# Patient Record
Sex: FEMALE | Race: WHITE | NOT HISPANIC OR LATINO | Employment: OTHER | URBAN - METROPOLITAN AREA
[De-identification: names, ages, dates, MRNs, and addresses within clinical notes are randomized per-mention and may not be internally consistent; named-entity substitution may affect disease eponyms.]

---

## 2023-10-10 ENCOUNTER — PREP FOR PROCEDURE (OUTPATIENT)
Dept: NEUROSURGERY | Facility: HOSPITAL | Age: 63
End: 2023-10-10
Payer: COMMERCIAL

## 2023-10-10 DIAGNOSIS — D35.2 PITUITARY ADENOMA (MULTI): Primary | ICD-10-CM

## 2023-10-13 PROBLEM — D35.2 PITUITARY ADENOMA (MULTI): Status: ACTIVE | Noted: 2023-10-10

## 2023-10-22 PROBLEM — E87.6 HYPOKALEMIA: Status: ACTIVE | Noted: 2018-09-03

## 2023-10-22 PROBLEM — R03.0 ELEVATED BLOOD PRESSURE READING: Status: ACTIVE | Noted: 2018-09-11

## 2023-10-22 PROBLEM — G43.009 MIGRAINE WITHOUT AURA AND RESPONSIVE TO TREATMENT: Status: ACTIVE | Noted: 2018-02-26

## 2023-10-22 PROBLEM — E55.9 VITAMIN D DEFICIENCY: Status: ACTIVE | Noted: 2018-02-26

## 2023-10-22 PROBLEM — R43.8 DECREASED SENSE OF SMELL: Status: ACTIVE | Noted: 2023-10-22

## 2023-10-22 PROBLEM — J34.89 RHINORRHEA: Status: ACTIVE | Noted: 2023-10-22

## 2023-10-22 PROBLEM — M81.0 AGE-RELATED OSTEOPOROSIS WITHOUT CURRENT PATHOLOGICAL FRACTURE: Status: ACTIVE | Noted: 2018-02-26

## 2023-10-22 PROBLEM — R51.9 HEADACHE: Status: ACTIVE | Noted: 2018-09-03

## 2023-10-22 PROBLEM — R53.83 FATIGUE: Status: ACTIVE | Noted: 2018-09-03

## 2023-10-22 PROBLEM — H35.041: Status: ACTIVE | Noted: 2018-10-16

## 2023-10-22 PROBLEM — E78.5 HYPERLIPIDEMIA: Status: ACTIVE | Noted: 2018-02-26

## 2023-10-22 PROBLEM — R11.2 NAUSEA AND VOMITING: Status: ACTIVE | Noted: 2018-09-03

## 2023-10-22 PROBLEM — J34.2 DEVIATED SEPTUM: Status: ACTIVE | Noted: 2023-10-22

## 2023-10-22 RX ORDER — IBUPROFEN 600 MG/1
TABLET ORAL
Status: ON HOLD | COMMUNITY
Start: 2023-04-19 | End: 2023-11-17 | Stop reason: WASHOUT

## 2023-10-22 RX ORDER — PANTOPRAZOLE SODIUM 40 MG/1
40 TABLET, DELAYED RELEASE ORAL
COMMUNITY
Start: 2023-07-12

## 2023-10-22 RX ORDER — ETODOLAC 500 MG/1
1 TABLET, FILM COATED ORAL DAILY PRN
COMMUNITY
Start: 2018-09-04 | End: 2023-11-06

## 2023-10-22 RX ORDER — BUDESONIDE AND FORMOTEROL FUMARATE DIHYDRATE 160; 4.5 UG/1; UG/1
2 AEROSOL RESPIRATORY (INHALATION) 2 TIMES DAILY
COMMUNITY
Start: 2018-09-11 | End: 2023-11-06

## 2023-10-22 RX ORDER — METHOCARBAMOL 500 MG/1
TABLET, FILM COATED ORAL
COMMUNITY
Start: 2023-04-19 | End: 2023-11-06 | Stop reason: ALTCHOICE

## 2023-10-22 RX ORDER — VALACYCLOVIR HYDROCHLORIDE 500 MG/1
500 TABLET, FILM COATED ORAL 2 TIMES DAILY
COMMUNITY
Start: 2023-01-31

## 2023-10-22 RX ORDER — ATORVASTATIN CALCIUM 20 MG/1
20 TABLET, FILM COATED ORAL DAILY
COMMUNITY
Start: 2023-07-12

## 2023-10-22 RX ORDER — IBANDRONATE SODIUM 150 MG/1
150 TABLET, FILM COATED ORAL
COMMUNITY
End: 2023-11-06 | Stop reason: ALTCHOICE

## 2023-10-22 RX ORDER — LANOLIN ALCOHOL/MO/W.PET/CERES
1 CREAM (GRAM) TOPICAL DAILY
COMMUNITY
Start: 2018-09-04 | End: 2023-11-06 | Stop reason: ALTCHOICE

## 2023-10-22 RX ORDER — ERYTHROMYCIN 5 MG/G
OINTMENT OPHTHALMIC
COMMUNITY
Start: 2023-02-15 | End: 2023-11-06

## 2023-10-22 RX ORDER — ELETRIPTAN HYDROBROMIDE 40 MG/1
40 TABLET, FILM COATED ORAL ONCE AS NEEDED
COMMUNITY
End: 2023-11-06

## 2023-10-22 RX ORDER — CITALOPRAM 20 MG/1
20 TABLET, FILM COATED ORAL DAILY
COMMUNITY
Start: 2023-07-12

## 2023-10-22 RX ORDER — ATORVASTATIN CALCIUM 10 MG/1
1 TABLET, FILM COATED ORAL NIGHTLY
COMMUNITY
Start: 2019-06-14 | End: 2023-11-06

## 2023-10-24 ENCOUNTER — LAB (OUTPATIENT)
Dept: LAB | Facility: LAB | Age: 63
End: 2023-10-24
Payer: MEDICARE

## 2023-10-24 ENCOUNTER — OFFICE VISIT (OUTPATIENT)
Dept: ENDOCRINOLOGY | Facility: CLINIC | Age: 63
End: 2023-10-24
Payer: MEDICARE

## 2023-10-24 VITALS
SYSTOLIC BLOOD PRESSURE: 128 MMHG | WEIGHT: 182 LBS | HEIGHT: 65 IN | DIASTOLIC BLOOD PRESSURE: 78 MMHG | BODY MASS INDEX: 30.32 KG/M2

## 2023-10-24 DIAGNOSIS — R53.83 OTHER FATIGUE: ICD-10-CM

## 2023-10-24 DIAGNOSIS — E78.5 HYPERLIPIDEMIA, UNSPECIFIED HYPERLIPIDEMIA TYPE: ICD-10-CM

## 2023-10-24 DIAGNOSIS — M81.0 OSTEOPOROSIS, UNSPECIFIED OSTEOPOROSIS TYPE, UNSPECIFIED PATHOLOGICAL FRACTURE PRESENCE: ICD-10-CM

## 2023-10-24 DIAGNOSIS — D35.2 PITUITARY ADENOMA (MULTI): ICD-10-CM

## 2023-10-24 DIAGNOSIS — D35.2 PITUITARY ADENOMA (MULTI): Primary | ICD-10-CM

## 2023-10-24 LAB
CORTIS SERPL-MCNC: 13.8 UG/DL (ref 2.5–20)
FSH SERPL-ACNC: 42.7 IU/L
LH SERPL-ACNC: 16.6 IU/L
PROLACTIN SERPL-MCNC: 5.8 UG/L (ref 3–20)
T3FREE SERPL-MCNC: 3.1 PG/ML (ref 2.3–4.2)
T4 FREE SERPL-MCNC: 1.12 NG/DL (ref 0.78–1.48)
TSH SERPL-ACNC: 1.78 MIU/L (ref 0.44–3.98)

## 2023-10-24 PROCEDURE — 84146 ASSAY OF PROLACTIN: CPT

## 2023-10-24 PROCEDURE — 83002 ASSAY OF GONADOTROPIN (LH): CPT

## 2023-10-24 PROCEDURE — 99204 OFFICE O/P NEW MOD 45 MIN: CPT | Performed by: INTERNAL MEDICINE

## 2023-10-24 PROCEDURE — 84305 ASSAY OF SOMATOMEDIN: CPT

## 2023-10-24 PROCEDURE — 84439 ASSAY OF FREE THYROXINE: CPT

## 2023-10-24 PROCEDURE — 84443 ASSAY THYROID STIM HORMONE: CPT

## 2023-10-24 PROCEDURE — 82024 ASSAY OF ACTH: CPT

## 2023-10-24 PROCEDURE — 83001 ASSAY OF GONADOTROPIN (FSH): CPT

## 2023-10-24 PROCEDURE — 82533 TOTAL CORTISOL: CPT

## 2023-10-24 PROCEDURE — 36415 COLL VENOUS BLD VENIPUNCTURE: CPT

## 2023-10-24 PROCEDURE — 84481 FREE ASSAY (FT-3): CPT

## 2023-10-24 RX ORDER — DEXAMETHASONE 1 MG/1
TABLET ORAL
Qty: 1 TABLET | Refills: 0 | Status: SHIPPED | OUTPATIENT
Start: 2023-10-24 | End: 2023-11-19 | Stop reason: HOSPADM
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
## 2023-10-26 ENCOUNTER — LAB (OUTPATIENT)
Dept: LAB | Facility: LAB | Age: 63
End: 2023-10-26
Payer: MEDICARE

## 2023-10-26 DIAGNOSIS — D35.2 PITUITARY ADENOMA (MULTI): ICD-10-CM

## 2023-10-26 LAB
ACTH PLAS-MCNC: 47.2 PG/ML (ref 7.2–63.3)
CORTIS SERPL-MCNC: 1.7 UG/DL (ref 2.5–20)

## 2023-10-26 PROCEDURE — 36415 COLL VENOUS BLD VENIPUNCTURE: CPT

## 2023-10-26 PROCEDURE — 82533 TOTAL CORTISOL: CPT

## 2023-10-27 LAB
IGF-I SERPL-MCNC: 142 NG/ML (ref 34–241)
IGF-I Z-SCORE SERPL: 0.5

## 2023-10-31 ENCOUNTER — TELEMEDICINE CLINICAL SUPPORT (OUTPATIENT)
Dept: PREADMISSION TESTING | Facility: HOSPITAL | Age: 63
End: 2023-10-31
Payer: MEDICARE

## 2023-11-06 ENCOUNTER — HOSPITAL ENCOUNTER (OUTPATIENT)
Dept: CARDIOLOGY | Facility: HOSPITAL | Age: 63
Discharge: HOME | End: 2023-11-06
Payer: MEDICARE

## 2023-11-06 ENCOUNTER — PRE-ADMISSION TESTING (OUTPATIENT)
Dept: PREADMISSION TESTING | Facility: HOSPITAL | Age: 63
End: 2023-11-06
Payer: MEDICARE

## 2023-11-06 VITALS
WEIGHT: 183.1 LBS | BODY MASS INDEX: 31.26 KG/M2 | DIASTOLIC BLOOD PRESSURE: 87 MMHG | HEIGHT: 64 IN | TEMPERATURE: 98.7 F | SYSTOLIC BLOOD PRESSURE: 138 MMHG | HEART RATE: 70 BPM | OXYGEN SATURATION: 98 %

## 2023-11-06 DIAGNOSIS — Z01.818 PREOPERATIVE EXAMINATION: ICD-10-CM

## 2023-11-06 DIAGNOSIS — Z01.818 PREOPERATIVE EXAMINATION: Primary | ICD-10-CM

## 2023-11-06 DIAGNOSIS — E78.5 HYPERLIPIDEMIA, UNSPECIFIED HYPERLIPIDEMIA TYPE: ICD-10-CM

## 2023-11-06 DIAGNOSIS — D35.2 PITUITARY ADENOMA (MULTI): ICD-10-CM

## 2023-11-06 LAB
ABO GROUP (TYPE) IN BLOOD: NORMAL
ALBUMIN SERPL BCP-MCNC: 4.6 G/DL (ref 3.4–5)
ALP SERPL-CCNC: 144 U/L (ref 33–136)
ALT SERPL W P-5'-P-CCNC: 50 U/L (ref 7–45)
ANION GAP SERPL CALC-SCNC: 17 MMOL/L (ref 10–20)
ANTIBODY SCREEN: NORMAL
APPEARANCE UR: CLEAR
APTT PPP: 34 SECONDS (ref 27–38)
AST SERPL W P-5'-P-CCNC: 42 U/L (ref 9–39)
BILIRUB SERPL-MCNC: 0.9 MG/DL (ref 0–1.2)
BILIRUB UR STRIP.AUTO-MCNC: NEGATIVE MG/DL
BUN SERPL-MCNC: 16 MG/DL (ref 6–23)
CALCIUM SERPL-MCNC: 10.2 MG/DL (ref 8.6–10.6)
CHLORIDE SERPL-SCNC: 107 MMOL/L (ref 98–107)
CO2 SERPL-SCNC: 23 MMOL/L (ref 21–32)
COLOR UR: YELLOW
CREAT SERPL-MCNC: 0.76 MG/DL (ref 0.5–1.05)
ERYTHROCYTE [DISTWIDTH] IN BLOOD BY AUTOMATED COUNT: 14.1 % (ref 11.5–14.5)
GFR SERPL CREATININE-BSD FRML MDRD: 87 ML/MIN/1.73M*2
GLUCOSE SERPL-MCNC: 93 MG/DL (ref 74–99)
GLUCOSE UR STRIP.AUTO-MCNC: NEGATIVE MG/DL
HCT VFR BLD AUTO: 44.1 % (ref 36–46)
HGB BLD-MCNC: 14.4 G/DL (ref 12–16)
HOLD SPECIMEN: NORMAL
INR PPP: 1.1 (ref 0.9–1.1)
KETONES UR STRIP.AUTO-MCNC: NEGATIVE MG/DL
LEUKOCYTE ESTERASE UR QL STRIP.AUTO: ABNORMAL
MCH RBC QN AUTO: 29.6 PG (ref 26–34)
MCHC RBC AUTO-ENTMCNC: 32.7 G/DL (ref 32–36)
MCV RBC AUTO: 91 FL (ref 80–100)
MUCOUS THREADS #/AREA URNS AUTO: NORMAL /LPF
NITRITE UR QL STRIP.AUTO: NEGATIVE
NRBC BLD-RTO: 0 /100 WBCS (ref 0–0)
PH UR STRIP.AUTO: 5 [PH]
PLATELET # BLD AUTO: 256 X10*3/UL (ref 150–450)
POTASSIUM SERPL-SCNC: 4.5 MMOL/L (ref 3.5–5.3)
PROT SERPL-MCNC: 7.6 G/DL (ref 6.4–8.2)
PROT UR STRIP.AUTO-MCNC: NEGATIVE MG/DL
PROTHROMBIN TIME: 12 SECONDS (ref 9.8–12.8)
RBC # BLD AUTO: 4.87 X10*6/UL (ref 4–5.2)
RBC # UR STRIP.AUTO: NEGATIVE /UL
RBC #/AREA URNS AUTO: NORMAL /HPF
RH FACTOR (ANTIGEN D): NORMAL
SODIUM SERPL-SCNC: 142 MMOL/L (ref 136–145)
SP GR UR STRIP.AUTO: 1.02
SQUAMOUS #/AREA URNS AUTO: NORMAL /HPF
UROBILINOGEN UR STRIP.AUTO-MCNC: <2 MG/DL
WBC # BLD AUTO: 8.7 X10*3/UL (ref 4.4–11.3)
WBC #/AREA URNS AUTO: NORMAL /HPF

## 2023-11-06 PROCEDURE — 36415 COLL VENOUS BLD VENIPUNCTURE: CPT

## 2023-11-06 PROCEDURE — 93005 ELECTROCARDIOGRAM TRACING: CPT

## 2023-11-06 PROCEDURE — 93010 ELECTROCARDIOGRAM REPORT: CPT | Performed by: INTERNAL MEDICINE

## 2023-11-06 PROCEDURE — 81001 URINALYSIS AUTO W/SCOPE: CPT | Performed by: NURSE PRACTITIONER

## 2023-11-06 PROCEDURE — 85730 THROMBOPLASTIN TIME PARTIAL: CPT | Performed by: NURSE PRACTITIONER

## 2023-11-06 PROCEDURE — 87086 URINE CULTURE/COLONY COUNT: CPT | Performed by: NURSE PRACTITIONER

## 2023-11-06 PROCEDURE — 80053 COMPREHEN METABOLIC PANEL: CPT | Performed by: NURSE PRACTITIONER

## 2023-11-06 PROCEDURE — 86920 COMPATIBILITY TEST SPIN: CPT

## 2023-11-06 PROCEDURE — 85610 PROTHROMBIN TIME: CPT | Performed by: NURSE PRACTITIONER

## 2023-11-06 PROCEDURE — 85027 COMPLETE CBC AUTOMATED: CPT | Performed by: NURSE PRACTITIONER

## 2023-11-06 PROCEDURE — 87081 CULTURE SCREEN ONLY: CPT | Performed by: NURSE PRACTITIONER

## 2023-11-06 PROCEDURE — 99204 OFFICE O/P NEW MOD 45 MIN: CPT | Performed by: NURSE PRACTITIONER

## 2023-11-06 PROCEDURE — 86901 BLOOD TYPING SEROLOGIC RH(D): CPT | Performed by: NURSE PRACTITIONER

## 2023-11-06 RX ORDER — CHLORHEXIDINE GLUCONATE ORAL RINSE 1.2 MG/ML
SOLUTION DENTAL
Qty: 473 ML | Refills: 0 | Status: SHIPPED | OUTPATIENT
Start: 2023-11-06 | End: 2023-11-19 | Stop reason: HOSPADM

## 2023-11-06 RX ORDER — CHLORHEXIDINE GLUCONATE 40 MG/ML
SOLUTION TOPICAL 2 TIMES DAILY
Qty: 473 ML | Refills: 0 | Status: SHIPPED | OUTPATIENT
Start: 2023-11-06 | End: 2023-11-19 | Stop reason: HOSPADM

## 2023-11-06 ASSESSMENT — DUKE ACTIVITY SCORE INDEX (DASI)
TOTAL_SCORE: 58.2
CAN YOU PARTICIPATE IN STRENOUS SPORTS LIKE SWIMMING, SINGLES TENNIS, FOOTBALL, BASKETBALL, OR SKIING: YES
CAN YOU DO HEAVY WORK AROUND THE HOUSE LIKE SCRUBBING FLOORS OR LIFTING AND MOVING HEAVY FURNITURE: YES
CAN YOU TAKE CARE OF YOURSELF (EAT, DRESS, BATHE, OR USE TOILET): YES
CAN YOU PARTICIPATE IN MODERATE RECREATIONAL ACTIVITIES LIKE GOLF, BOWLING, DANCING, DOUBLES TENNIS OR THROWING A BASEBALL OR FOOTBALL: YES
CAN YOU CLIMB A FLIGHT OF STAIRS OR WALK UP A HILL: YES
CAN YOU DO YARD WORK LIKE RAKING LEAVES, WEEDING OR PUSHING A MOWER: YES
CAN YOU RUN A SHORT DISTANCE: YES
CAN YOU WALK INDOORS, SUCH AS AROUND YOUR HOUSE: YES
CAN YOU DO MODERATE WORK AROUND THE HOUSE LIKE VACUUMING, SWEEPING FLOORS OR CARRYING GROCERIES: YES
CAN YOU DO LIGHT WORK AROUND THE HOUSE LIKE DUSTING OR WASHING DISHES: YES
CAN YOU HAVE SEXUAL RELATIONS: YES
CAN YOU TAKE CARE OF YOURSELF (EAT, DRESS, BATHE, OR USE TOILET): YES
DASI METS SCORE: 9.9
CAN YOU WALK A BLOCK OR TWO ON LEVEL GROUND: YES

## 2023-11-06 ASSESSMENT — ENCOUNTER SYMPTOMS
MUSCULOSKELETAL NEGATIVE: 1
GASTROINTESTINAL NEGATIVE: 1
ENDOCRINE NEGATIVE: 1
RESPIRATORY NEGATIVE: 1
CARDIOVASCULAR NEGATIVE: 1
NECK NEGATIVE: 1
EYES NEGATIVE: 1
NEUROLOGICAL NEGATIVE: 1
CONSTITUTIONAL NEGATIVE: 1

## 2023-11-06 ASSESSMENT — LIFESTYLE VARIABLES: SMOKING_STATUS: NONSMOKER

## 2023-11-06 NOTE — PREPROCEDURE INSTRUCTIONS
NPO Instructions:    Do not eat any food after midnight the night before your surgery/procedure.  You may have 10 ounces of clear liquids until TWO hours before surgery/procedure. This includes water, black tea/coffee, (no milk or cream) apple juice and electrolyte drinks (Gatorade).  You may chew gum up to TWO hours before your surgery/procedure.    Additional Instructions:     The Day before Surgery:  Review your medication instructions, stop indicated medications  You will be contacted in the evening regarding the time of your arrival to facility and surgery time    Day of Surgery:  Review your medication instructions, take indicated medications  Wear  comfortable loose fitting clothing  Do not use moisturizers, creams, lotions or perfume  All jewelry and valuables should be left at home      Samantha Meeson, MSN, NP-C  Adult-Gerontology Nurse Practitioner II  Department of Anesthesiology and Perioperative Medicine  Main phone [phone]  Direct phone [phone]  Fax [phone]
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
## 2023-11-07 LAB
BACTERIA UR CULT: NORMAL
STAPHYLOCOCCUS SPEC CULT: ABNORMAL

## 2023-11-08 ENCOUNTER — PREP FOR PROCEDURE (OUTPATIENT)
Dept: NEUROSURGERY | Facility: HOSPITAL | Age: 63
End: 2023-11-08
Payer: COMMERCIAL

## 2023-11-08 DIAGNOSIS — D35.2 PITUITARY ADENOMA (MULTI): Primary | ICD-10-CM

## 2023-11-08 LAB
ATRIAL RATE: 68 BPM
P AXIS: -8 DEGREES
P OFFSET: 201 MS
P ONSET: 152 MS
PR INTERVAL: 128 MS
Q ONSET: 216 MS
QRS COUNT: 11 BEATS
QRS DURATION: 68 MS
QT INTERVAL: 412 MS
QTC CALCULATION(BAZETT): 438 MS
QTC FREDERICIA: 429 MS
R AXIS: -18 DEGREES
T AXIS: -2 DEGREES
T OFFSET: 422 MS
VENTRICULAR RATE: 68 BPM

## 2023-11-16 ENCOUNTER — ANESTHESIA EVENT (OUTPATIENT)
Dept: OPERATING ROOM | Facility: HOSPITAL | Age: 63
DRG: 830 | End: 2023-11-16
Payer: MEDICARE

## 2023-11-17 ENCOUNTER — ANESTHESIA (OUTPATIENT)
Dept: OPERATING ROOM | Facility: HOSPITAL | Age: 63
DRG: 830 | End: 2023-11-17
Payer: MEDICARE

## 2023-11-17 ENCOUNTER — HOSPITAL ENCOUNTER (INPATIENT)
Facility: HOSPITAL | Age: 63
LOS: 2 days | Discharge: HOME | DRG: 830 | End: 2023-11-19
Attending: NEUROLOGICAL SURGERY | Admitting: NEUROLOGICAL SURGERY
Payer: MEDICARE

## 2023-11-17 DIAGNOSIS — D36.9 MACROADENOMA: Primary | ICD-10-CM

## 2023-11-17 DIAGNOSIS — D35.2 PITUITARY ADENOMA (MULTI): ICD-10-CM

## 2023-11-17 PROBLEM — I10 HTN (HYPERTENSION): Status: ACTIVE | Noted: 2023-11-17

## 2023-11-17 LAB
ABO GROUP (TYPE) IN BLOOD: NORMAL
ALBUMIN SERPL BCP-MCNC: 3.7 G/DL (ref 3.4–5)
ALBUMIN SERPL BCP-MCNC: 4.2 G/DL (ref 3.4–5)
ALBUMIN SERPL BCP-MCNC: 4.2 G/DL (ref 3.4–5)
ALBUMIN SERPL BCP-MCNC: 4.4 G/DL (ref 3.4–5)
ANION GAP SERPL CALC-SCNC: 12 MMOL/L (ref 10–20)
ANION GAP SERPL CALC-SCNC: 12 MMOL/L (ref 10–20)
ANION GAP SERPL CALC-SCNC: 13 MMOL/L (ref 10–20)
ANION GAP SERPL CALC-SCNC: 14 MMOL/L (ref 10–20)
BUN SERPL-MCNC: 15 MG/DL (ref 6–23)
BUN SERPL-MCNC: 17 MG/DL (ref 6–23)
BUN SERPL-MCNC: 18 MG/DL (ref 6–23)
BUN SERPL-MCNC: 22 MG/DL (ref 6–23)
CALCIUM SERPL-MCNC: 8.6 MG/DL (ref 8.6–10.6)
CALCIUM SERPL-MCNC: 9 MG/DL (ref 8.6–10.6)
CALCIUM SERPL-MCNC: 9.1 MG/DL (ref 8.6–10.6)
CALCIUM SERPL-MCNC: 9.7 MG/DL (ref 8.6–10.6)
CHLORIDE SERPL-SCNC: 102 MMOL/L (ref 98–107)
CHLORIDE SERPL-SCNC: 105 MMOL/L (ref 98–107)
CHLORIDE SERPL-SCNC: 106 MMOL/L (ref 98–107)
CHLORIDE SERPL-SCNC: 108 MMOL/L (ref 98–107)
CHLORIDE UR-SCNC: 217 MMOL/L
CHLORIDE/CREATININE (MMOL/G) IN URINE: 315 MMOL/G CREAT (ref 38–318)
CO2 SERPL-SCNC: 23 MMOL/L (ref 21–32)
CO2 SERPL-SCNC: 24 MMOL/L (ref 21–32)
CORTIS SERPL-MCNC: 10.2 UG/DL (ref 2.5–20)
CORTIS SERPL-MCNC: 47.6 UG/DL (ref 2.5–20)
CORTIS SERPL-MCNC: 56.1 UG/DL (ref 2.5–20)
CORTIS SERPL-MCNC: 56.3 UG/DL (ref 2.5–20)
CORTIS SERPL-MCNC: 57 UG/DL (ref 2.5–20)
CORTIS SERPL-MCNC: 8.2 UG/DL (ref 2.5–20)
CREAT SERPL-MCNC: 0.63 MG/DL (ref 0.5–1.05)
CREAT SERPL-MCNC: 0.65 MG/DL (ref 0.5–1.05)
CREAT SERPL-MCNC: 0.67 MG/DL (ref 0.5–1.05)
CREAT SERPL-MCNC: 0.69 MG/DL (ref 0.5–1.05)
CREAT UR-MCNC: 68.9 MG/DL (ref 20–320)
CREAT UR-MCNC: 68.9 MG/DL (ref 20–320)
DHEA-S SERPL-MCNC: 20 UG/DL (ref 13–130)
DHEA-S SERPL-MCNC: 29 UG/DL (ref 13–130)
FSH SERPL-ACNC: 34.1 IU/L
FSH SERPL-ACNC: 42.9 IU/L
GFR SERPL CREATININE-BSD FRML MDRD: >90 ML/MIN/1.73M*2
GLUCOSE SERPL-MCNC: 105 MG/DL (ref 74–99)
GLUCOSE SERPL-MCNC: 114 MG/DL (ref 74–99)
GLUCOSE SERPL-MCNC: 124 MG/DL (ref 74–99)
GLUCOSE SERPL-MCNC: 154 MG/DL (ref 74–99)
LH SERPL-ACNC: 17.6 IU/L
LH SERPL-ACNC: 9.9 IU/L
OSMOLALITY SERPL: 295 MOSM/KG (ref 280–300)
OSMOLALITY UR: 804 MOSM/KG (ref 200–1200)
PHOSPHATE SERPL-MCNC: 3.6 MG/DL (ref 2.5–4.9)
PHOSPHATE SERPL-MCNC: 3.7 MG/DL (ref 2.5–4.9)
PHOSPHATE SERPL-MCNC: 3.8 MG/DL (ref 2.5–4.9)
PHOSPHATE SERPL-MCNC: 4.5 MG/DL (ref 2.5–4.9)
POTASSIUM SERPL-SCNC: 3.8 MMOL/L (ref 3.5–5.3)
POTASSIUM SERPL-SCNC: 3.8 MMOL/L (ref 3.5–5.3)
POTASSIUM SERPL-SCNC: 4 MMOL/L (ref 3.5–5.3)
POTASSIUM SERPL-SCNC: 4.4 MMOL/L (ref 3.5–5.3)
POTASSIUM UR-SCNC: 89 MMOL/L
POTASSIUM/CREAT UR-RTO: 129 MMOL/G CREAT
PROLACTIN SERPL-MCNC: 2.8 UG/L (ref 3–20)
PROLACTIN SERPL-MCNC: 27.8 UG/L (ref 3–20)
PROLACTIN SERPL-MCNC: 4.8 UG/L (ref 3–20)
PROLACTIN SERPL-MCNC: 6.1 UG/L (ref 3–20)
RH FACTOR (ANTIGEN D): NORMAL
SODIUM SERPL-SCNC: 135 MMOL/L (ref 136–145)
SODIUM SERPL-SCNC: 137 MMOL/L (ref 136–145)
SODIUM SERPL-SCNC: 138 MMOL/L (ref 136–145)
SODIUM SERPL-SCNC: 139 MMOL/L (ref 136–145)
SODIUM UR-SCNC: 228 MMOL/L
SODIUM UR-SCNC: 228 MMOL/L
SODIUM/CREAT UR-RTO: 331 MMOL/G CREAT
SODIUM/CREAT UR-RTO: 331 MMOL/G CREAT
SP GR UR STRIP.AUTO: 1.02
T3 SERPL-MCNC: 173 NG/DL (ref 60–200)
T3FREE SERPL-MCNC: 4.2 PG/ML (ref 2.3–4.2)
T4 FREE SERPL-MCNC: 1.1 NG/DL (ref 0.78–1.48)
T4 FREE SERPL-MCNC: 1.49 NG/DL (ref 0.78–1.48)
TSH SERPL-ACNC: 4.84 MIU/L (ref 0.44–3.98)
TSH SERPL-ACNC: 6.77 MIU/L (ref 0.44–3.98)

## 2023-11-17 PROCEDURE — 82670 ASSAY OF TOTAL ESTRADIOL: CPT | Performed by: STUDENT IN AN ORGANIZED HEALTH CARE EDUCATION/TRAINING PROGRAM

## 2023-11-17 PROCEDURE — 7100000002 HC RECOVERY ROOM TIME - EACH INCREMENTAL 1 MINUTE: Performed by: NEUROLOGICAL SURGERY

## 2023-11-17 PROCEDURE — 62165 REMOVE PITUIT TUMOR W/SCOPE: CPT | Performed by: OTOLARYNGOLOGY

## 2023-11-17 PROCEDURE — 2500000001 HC RX 250 WO HCPCS SELF ADMINISTERED DRUGS (ALT 637 FOR MEDICARE OP): Performed by: STUDENT IN AN ORGANIZED HEALTH CARE EDUCATION/TRAINING PROGRAM

## 2023-11-17 PROCEDURE — 82024 ASSAY OF ACTH: CPT

## 2023-11-17 PROCEDURE — 36415 COLL VENOUS BLD VENIPUNCTURE: CPT

## 2023-11-17 PROCEDURE — 96372 THER/PROPH/DIAG INJ SC/IM: CPT | Performed by: OTOLARYNGOLOGY

## 2023-11-17 PROCEDURE — 61782 SCAN PROC CRANIAL EXTRA: CPT | Performed by: OTOLARYNGOLOGY

## 2023-11-17 PROCEDURE — A62165 PR NEUROENDOSCOP,EXC,PIT TUM,TRANSNAS/SPHEN: Performed by: ANESTHESIOLOGY

## 2023-11-17 PROCEDURE — 82397 CHEMILUMINESCENT ASSAY: CPT | Performed by: STUDENT IN AN ORGANIZED HEALTH CARE EDUCATION/TRAINING PROGRAM

## 2023-11-17 PROCEDURE — 82626 DEHYDROEPIANDROSTERONE: CPT | Performed by: STUDENT IN AN ORGANIZED HEALTH CARE EDUCATION/TRAINING PROGRAM

## 2023-11-17 PROCEDURE — 83003 ASSAY GROWTH HORMONE (HGH): CPT | Performed by: STUDENT IN AN ORGANIZED HEALTH CARE EDUCATION/TRAINING PROGRAM

## 2023-11-17 PROCEDURE — 83001 ASSAY OF GONADOTROPIN (FSH): CPT | Performed by: STUDENT IN AN ORGANIZED HEALTH CARE EDUCATION/TRAINING PROGRAM

## 2023-11-17 PROCEDURE — 2500000005 HC RX 250 GENERAL PHARMACY W/O HCPCS: Performed by: NEUROLOGICAL SURGERY

## 2023-11-17 PROCEDURE — 84133 ASSAY OF URINE POTASSIUM: CPT | Performed by: STUDENT IN AN ORGANIZED HEALTH CARE EDUCATION/TRAINING PROGRAM

## 2023-11-17 PROCEDURE — 2060000001 HC INTERMEDIATE ICU ROOM DAILY

## 2023-11-17 PROCEDURE — 82533 TOTAL CORTISOL: CPT | Performed by: STUDENT IN AN ORGANIZED HEALTH CARE EDUCATION/TRAINING PROGRAM

## 2023-11-17 PROCEDURE — 2500000004 HC RX 250 GENERAL PHARMACY W/ HCPCS (ALT 636 FOR OP/ED)

## 2023-11-17 PROCEDURE — 2500000001 HC RX 250 WO HCPCS SELF ADMINISTERED DRUGS (ALT 637 FOR MEDICARE OP)

## 2023-11-17 PROCEDURE — 84146 ASSAY OF PROLACTIN: CPT | Performed by: STUDENT IN AN ORGANIZED HEALTH CARE EDUCATION/TRAINING PROGRAM

## 2023-11-17 PROCEDURE — 88342 IMHCHEM/IMCYTCHM 1ST ANTB: CPT | Performed by: PATHOLOGY

## 2023-11-17 PROCEDURE — 82570 ASSAY OF URINE CREATININE: CPT | Performed by: STUDENT IN AN ORGANIZED HEALTH CARE EDUCATION/TRAINING PROGRAM

## 2023-11-17 PROCEDURE — A4217 STERILE WATER/SALINE, 500 ML: HCPCS | Performed by: OTOLARYNGOLOGY

## 2023-11-17 PROCEDURE — 7100000001 HC RECOVERY ROOM TIME - INITIAL BASE CHARGE: Performed by: NEUROLOGICAL SURGERY

## 2023-11-17 PROCEDURE — 62165 REMOVE PITUIT TUMOR W/SCOPE: CPT | Performed by: NEUROLOGICAL SURGERY

## 2023-11-17 PROCEDURE — 84443 ASSAY THYROID STIM HORMONE: CPT | Performed by: STUDENT IN AN ORGANIZED HEALTH CARE EDUCATION/TRAINING PROGRAM

## 2023-11-17 PROCEDURE — 82533 TOTAL CORTISOL: CPT

## 2023-11-17 PROCEDURE — 84481 FREE ASSAY (FT-3): CPT | Performed by: STUDENT IN AN ORGANIZED HEALTH CARE EDUCATION/TRAINING PROGRAM

## 2023-11-17 PROCEDURE — 2500000004 HC RX 250 GENERAL PHARMACY W/ HCPCS (ALT 636 FOR OP/ED): Performed by: OTOLARYNGOLOGY

## 2023-11-17 PROCEDURE — 2720000007 HC OR 272 NO HCPCS: Performed by: NEUROLOGICAL SURGERY

## 2023-11-17 PROCEDURE — 84146 ASSAY OF PROLACTIN: CPT

## 2023-11-17 PROCEDURE — 2780000003 HC OR 278 NO HCPCS: Performed by: NEUROLOGICAL SURGERY

## 2023-11-17 PROCEDURE — 83935 ASSAY OF URINE OSMOLALITY: CPT | Performed by: STUDENT IN AN ORGANIZED HEALTH CARE EDUCATION/TRAINING PROGRAM

## 2023-11-17 PROCEDURE — 36620 INSERTION CATHETER ARTERY: CPT

## 2023-11-17 PROCEDURE — 84439 ASSAY OF FREE THYROXINE: CPT | Performed by: STUDENT IN AN ORGANIZED HEALTH CARE EDUCATION/TRAINING PROGRAM

## 2023-11-17 PROCEDURE — 09BK4ZZ EXCISION OF NASAL MUCOSA AND SOFT TISSUE, PERCUTANEOUS ENDOSCOPIC APPROACH: ICD-10-PCS | Performed by: NEUROLOGICAL SURGERY

## 2023-11-17 PROCEDURE — 2500000005 HC RX 250 GENERAL PHARMACY W/O HCPCS: Performed by: OTOLARYNGOLOGY

## 2023-11-17 PROCEDURE — 37799 UNLISTED PX VASCULAR SURGERY: CPT | Performed by: STUDENT IN AN ORGANIZED HEALTH CARE EDUCATION/TRAINING PROGRAM

## 2023-11-17 PROCEDURE — 82024 ASSAY OF ACTH: CPT | Performed by: STUDENT IN AN ORGANIZED HEALTH CARE EDUCATION/TRAINING PROGRAM

## 2023-11-17 PROCEDURE — 2500000004 HC RX 250 GENERAL PHARMACY W/ HCPCS (ALT 636 FOR OP/ED): Performed by: STUDENT IN AN ORGANIZED HEALTH CARE EDUCATION/TRAINING PROGRAM

## 2023-11-17 PROCEDURE — 84305 ASSAY OF SOMATOMEDIN: CPT | Performed by: STUDENT IN AN ORGANIZED HEALTH CARE EDUCATION/TRAINING PROGRAM

## 2023-11-17 PROCEDURE — 0GB04ZZ EXCISION OF PITUITARY GLAND, PERCUTANEOUS ENDOSCOPIC APPROACH: ICD-10-PCS | Performed by: NEUROLOGICAL SURGERY

## 2023-11-17 PROCEDURE — 09UX47Z SUPPLEMENT LEFT SPHENOID SINUS WITH AUTOLOGOUS TISSUE SUBSTITUTE, PERCUTANEOUS ENDOSCOPIC APPROACH: ICD-10-PCS | Performed by: NEUROLOGICAL SURGERY

## 2023-11-17 PROCEDURE — 3700000001 HC GENERAL ANESTHESIA TIME - INITIAL BASE CHARGE: Performed by: NEUROLOGICAL SURGERY

## 2023-11-17 PROCEDURE — 37799 UNLISTED PX VASCULAR SURGERY: CPT

## 2023-11-17 PROCEDURE — 3600000018 HC OR TIME - INITIAL BASE CHARGE - PROCEDURE LEVEL SIX: Performed by: NEUROLOGICAL SURGERY

## 2023-11-17 PROCEDURE — 3700000002 HC GENERAL ANESTHESIA TIME - EACH INCREMENTAL 1 MINUTE: Performed by: NEUROLOGICAL SURGERY

## 2023-11-17 PROCEDURE — 83930 ASSAY OF BLOOD OSMOLALITY: CPT | Performed by: STUDENT IN AN ORGANIZED HEALTH CARE EDUCATION/TRAINING PROGRAM

## 2023-11-17 PROCEDURE — 99223 1ST HOSP IP/OBS HIGH 75: CPT | Performed by: INTERNAL MEDICINE

## 2023-11-17 PROCEDURE — 3600000017 HC OR TIME - EACH INCREMENTAL 1 MINUTE - PROCEDURE LEVEL SIX: Performed by: NEUROLOGICAL SURGERY

## 2023-11-17 PROCEDURE — 2500000001 HC RX 250 WO HCPCS SELF ADMINISTERED DRUGS (ALT 637 FOR MEDICARE OP): Performed by: OTOLARYNGOLOGY

## 2023-11-17 PROCEDURE — 83002 ASSAY OF GONADOTROPIN (LH): CPT | Performed by: STUDENT IN AN ORGANIZED HEALTH CARE EDUCATION/TRAINING PROGRAM

## 2023-11-17 PROCEDURE — 88341 IMHCHEM/IMCYTCHM EA ADD ANTB: CPT | Performed by: PATHOLOGY

## 2023-11-17 PROCEDURE — 88305 TISSUE EXAM BY PATHOLOGIST: CPT | Performed by: PATHOLOGY

## 2023-11-17 PROCEDURE — 09UW47Z SUPPLEMENT RIGHT SPHENOID SINUS WITH AUTOLOGOUS TISSUE SUBSTITUTE, PERCUTANEOUS ENDOSCOPIC APPROACH: ICD-10-PCS | Performed by: NEUROLOGICAL SURGERY

## 2023-11-17 PROCEDURE — 80069 RENAL FUNCTION PANEL: CPT | Performed by: STUDENT IN AN ORGANIZED HEALTH CARE EDUCATION/TRAINING PROGRAM

## 2023-11-17 PROCEDURE — 88313 SPECIAL STAINS GROUP 2: CPT | Performed by: PATHOLOGY

## 2023-11-17 PROCEDURE — 15769 GRFG AUTOL SOFT TISS DIR EXC: CPT | Performed by: OTOLARYNGOLOGY

## 2023-11-17 PROCEDURE — 82672 ASSAY OF ESTROGEN: CPT | Performed by: STUDENT IN AN ORGANIZED HEALTH CARE EDUCATION/TRAINING PROGRAM

## 2023-11-17 PROCEDURE — 80069 RENAL FUNCTION PANEL: CPT

## 2023-11-17 PROCEDURE — 88341 IMHCHEM/IMCYTCHM EA ADD ANTB: CPT | Mod: TC,SUR | Performed by: NEUROLOGICAL SURGERY

## 2023-11-17 PROCEDURE — 84480 ASSAY TRIIODOTHYRONINE (T3): CPT | Performed by: STUDENT IN AN ORGANIZED HEALTH CARE EDUCATION/TRAINING PROGRAM

## 2023-11-17 PROCEDURE — 2500000005 HC RX 250 GENERAL PHARMACY W/O HCPCS

## 2023-11-17 PROCEDURE — 82627 DEHYDROEPIANDROSTERONE: CPT | Performed by: STUDENT IN AN ORGANIZED HEALTH CARE EDUCATION/TRAINING PROGRAM

## 2023-11-17 PROCEDURE — 81003 URINALYSIS AUTO W/O SCOPE: CPT | Performed by: STUDENT IN AN ORGANIZED HEALTH CARE EDUCATION/TRAINING PROGRAM

## 2023-11-17 RX ORDER — CEFAZOLIN SODIUM 2 G/100ML
2 INJECTION, SOLUTION INTRAVENOUS EVERY 8 HOURS
Status: COMPLETED | OUTPATIENT
Start: 2023-11-17 | End: 2023-11-18

## 2023-11-17 RX ORDER — LIDOCAINE HYDROCHLORIDE 20 MG/ML
INJECTION, SOLUTION INFILTRATION; PERINEURAL AS NEEDED
Status: DISCONTINUED | OUTPATIENT
Start: 2023-11-17 | End: 2023-11-17

## 2023-11-17 RX ORDER — ROCURONIUM BROMIDE 10 MG/ML
INJECTION, SOLUTION INTRAVENOUS AS NEEDED
Status: DISCONTINUED | OUTPATIENT
Start: 2023-11-17 | End: 2023-11-17

## 2023-11-17 RX ORDER — ACETAMINOPHEN 325 MG/1
TABLET ORAL AS NEEDED
Status: DISCONTINUED | OUTPATIENT
Start: 2023-11-17 | End: 2023-11-17

## 2023-11-17 RX ORDER — ESMOLOL HYDROCHLORIDE 10 MG/ML
INJECTION INTRAVENOUS AS NEEDED
Status: DISCONTINUED | OUTPATIENT
Start: 2023-11-17 | End: 2023-11-17

## 2023-11-17 RX ORDER — ACETAMINOPHEN 325 MG/1
650 TABLET ORAL EVERY 6 HOURS
Status: DISCONTINUED | OUTPATIENT
Start: 2023-11-17 | End: 2023-11-19 | Stop reason: HOSPADM

## 2023-11-17 RX ORDER — SODIUM CHLORIDE, SODIUM LACTATE, POTASSIUM CHLORIDE, CALCIUM CHLORIDE 600; 310; 30; 20 MG/100ML; MG/100ML; MG/100ML; MG/100ML
INJECTION, SOLUTION INTRAVENOUS CONTINUOUS PRN
Status: DISCONTINUED | OUTPATIENT
Start: 2023-11-17 | End: 2023-11-17

## 2023-11-17 RX ORDER — OXYCODONE HYDROCHLORIDE 5 MG/1
5 TABLET ORAL EVERY 4 HOURS PRN
Status: DISCONTINUED | OUTPATIENT
Start: 2023-11-17 | End: 2023-11-17 | Stop reason: HOSPADM

## 2023-11-17 RX ORDER — NALOXONE HYDROCHLORIDE 0.4 MG/ML
0.2 INJECTION, SOLUTION INTRAMUSCULAR; INTRAVENOUS; SUBCUTANEOUS EVERY 5 MIN PRN
Status: DISCONTINUED | OUTPATIENT
Start: 2023-11-17 | End: 2023-11-19 | Stop reason: HOSPADM

## 2023-11-17 RX ORDER — ONDANSETRON HYDROCHLORIDE 2 MG/ML
INJECTION, SOLUTION INTRAVENOUS AS NEEDED
Status: DISCONTINUED | OUTPATIENT
Start: 2023-11-17 | End: 2023-11-17

## 2023-11-17 RX ORDER — ATORVASTATIN CALCIUM 20 MG/1
20 TABLET, FILM COATED ORAL NIGHTLY
Status: CANCELLED | OUTPATIENT
Start: 2023-11-17

## 2023-11-17 RX ORDER — AMOXICILLIN 250 MG
2 CAPSULE ORAL 2 TIMES DAILY
Status: DISCONTINUED | OUTPATIENT
Start: 2023-11-17 | End: 2023-11-19 | Stop reason: HOSPADM

## 2023-11-17 RX ORDER — HYDROMORPHONE HYDROCHLORIDE 1 MG/ML
INJECTION, SOLUTION INTRAMUSCULAR; INTRAVENOUS; SUBCUTANEOUS AS NEEDED
Status: DISCONTINUED | OUTPATIENT
Start: 2023-11-17 | End: 2023-11-17

## 2023-11-17 RX ORDER — HYDROMORPHONE HYDROCHLORIDE 1 MG/ML
0.5 INJECTION, SOLUTION INTRAMUSCULAR; INTRAVENOUS; SUBCUTANEOUS EVERY 5 MIN PRN
Status: DISCONTINUED | OUTPATIENT
Start: 2023-11-17 | End: 2023-11-17 | Stop reason: HOSPADM

## 2023-11-17 RX ORDER — CITALOPRAM 20 MG/1
20 TABLET, FILM COATED ORAL DAILY
Status: DISCONTINUED | OUTPATIENT
Start: 2023-11-18 | End: 2023-11-19 | Stop reason: HOSPADM

## 2023-11-17 RX ORDER — REMIFENTANIL HYDROCHLORIDE 1 MG/ML
INJECTION, POWDER, LYOPHILIZED, FOR SOLUTION INTRAVENOUS AS NEEDED
Status: DISCONTINUED | OUTPATIENT
Start: 2023-11-17 | End: 2023-11-17

## 2023-11-17 RX ORDER — SODIUM CHLORIDE, SODIUM LACTATE, POTASSIUM CHLORIDE, CALCIUM CHLORIDE 600; 310; 30; 20 MG/100ML; MG/100ML; MG/100ML; MG/100ML
100 INJECTION, SOLUTION INTRAVENOUS CONTINUOUS
Status: DISCONTINUED | OUTPATIENT
Start: 2023-11-17 | End: 2023-11-17 | Stop reason: HOSPADM

## 2023-11-17 RX ORDER — OXYMETAZOLINE HCL 0.05 %
SPRAY, NON-AEROSOL (ML) NASAL AS NEEDED
Status: DISCONTINUED | OUTPATIENT
Start: 2023-11-17 | End: 2023-11-17 | Stop reason: HOSPADM

## 2023-11-17 RX ORDER — MIDAZOLAM HYDROCHLORIDE 1 MG/ML
INJECTION INTRAMUSCULAR; INTRAVENOUS AS NEEDED
Status: DISCONTINUED | OUTPATIENT
Start: 2023-11-17 | End: 2023-11-17

## 2023-11-17 RX ORDER — REMIFENTANIL HYDROCHLORIDE 1 MG/ML
INJECTION, POWDER, LYOPHILIZED, FOR SOLUTION INTRAVENOUS CONTINUOUS PRN
Status: DISCONTINUED | OUTPATIENT
Start: 2023-11-17 | End: 2023-11-17

## 2023-11-17 RX ORDER — LIDOCAINE HYDROCHLORIDE 10 MG/ML
0.1 INJECTION, SOLUTION EPIDURAL; INFILTRATION; INTRACAUDAL; PERINEURAL ONCE
Status: DISCONTINUED | OUTPATIENT
Start: 2023-11-17 | End: 2023-11-17 | Stop reason: HOSPADM

## 2023-11-17 RX ORDER — PANTOPRAZOLE SODIUM 40 MG/1
40 TABLET, DELAYED RELEASE ORAL
Status: DISCONTINUED | OUTPATIENT
Start: 2023-11-18 | End: 2023-11-19 | Stop reason: HOSPADM

## 2023-11-17 RX ORDER — LABETALOL HYDROCHLORIDE 5 MG/ML
10 INJECTION, SOLUTION INTRAVENOUS EVERY 10 MIN PRN
Status: DISCONTINUED | OUTPATIENT
Start: 2023-11-17 | End: 2023-11-19 | Stop reason: HOSPADM

## 2023-11-17 RX ORDER — PROPOFOL 10 MG/ML
INJECTION, EMULSION INTRAVENOUS CONTINUOUS PRN
Status: DISCONTINUED | OUTPATIENT
Start: 2023-11-17 | End: 2023-11-17

## 2023-11-17 RX ORDER — OXYCODONE HYDROCHLORIDE 5 MG/1
5 TABLET ORAL EVERY 4 HOURS PRN
Status: DISCONTINUED | OUTPATIENT
Start: 2023-11-17 | End: 2023-11-19 | Stop reason: HOSPADM

## 2023-11-17 RX ORDER — ATORVASTATIN CALCIUM 20 MG/1
20 TABLET, FILM COATED ORAL DAILY
Status: DISCONTINUED | OUTPATIENT
Start: 2023-11-18 | End: 2023-11-19 | Stop reason: HOSPADM

## 2023-11-17 RX ORDER — ASPIRIN 81 MG/1
81 TABLET ORAL 3 TIMES WEEKLY
Status: ON HOLD | COMMUNITY
End: 2023-11-19 | Stop reason: SDUPTHER

## 2023-11-17 RX ORDER — CEFAZOLIN 1 G/1
INJECTION, POWDER, FOR SOLUTION INTRAVENOUS AS NEEDED
Status: DISCONTINUED | OUTPATIENT
Start: 2023-11-17 | End: 2023-11-17

## 2023-11-17 RX ORDER — PANTOPRAZOLE SODIUM 20 MG/1
20 TABLET, DELAYED RELEASE ORAL
Status: CANCELLED | OUTPATIENT
Start: 2023-11-17

## 2023-11-17 RX ORDER — SODIUM CHLORIDE 0.9 G/100ML
IRRIGANT IRRIGATION AS NEEDED
Status: DISCONTINUED | OUTPATIENT
Start: 2023-11-17 | End: 2023-11-17 | Stop reason: HOSPADM

## 2023-11-17 RX ORDER — OXYCODONE HYDROCHLORIDE 5 MG/1
10 TABLET ORAL EVERY 4 HOURS PRN
Status: DISCONTINUED | OUTPATIENT
Start: 2023-11-17 | End: 2023-11-17 | Stop reason: HOSPADM

## 2023-11-17 RX ORDER — CITALOPRAM 20 MG/1
20 TABLET, FILM COATED ORAL DAILY
Status: CANCELLED | OUTPATIENT
Start: 2023-11-17

## 2023-11-17 RX ORDER — OXYCODONE HYDROCHLORIDE 5 MG/1
10 TABLET ORAL EVERY 4 HOURS PRN
Status: DISCONTINUED | OUTPATIENT
Start: 2023-11-17 | End: 2023-11-19 | Stop reason: HOSPADM

## 2023-11-17 RX ORDER — HYDRALAZINE HYDROCHLORIDE 20 MG/ML
10 INJECTION INTRAMUSCULAR; INTRAVENOUS
Status: DISCONTINUED | OUTPATIENT
Start: 2023-11-17 | End: 2023-11-19 | Stop reason: HOSPADM

## 2023-11-17 RX ORDER — FENTANYL CITRATE 50 UG/ML
INJECTION, SOLUTION INTRAMUSCULAR; INTRAVENOUS AS NEEDED
Status: DISCONTINUED | OUTPATIENT
Start: 2023-11-17 | End: 2023-11-17

## 2023-11-17 RX ORDER — HYDROMORPHONE HYDROCHLORIDE 1 MG/ML
0.2 INJECTION, SOLUTION INTRAMUSCULAR; INTRAVENOUS; SUBCUTANEOUS EVERY 5 MIN PRN
Status: DISCONTINUED | OUTPATIENT
Start: 2023-11-17 | End: 2023-11-17 | Stop reason: HOSPADM

## 2023-11-17 RX ORDER — PROMETHAZINE HYDROCHLORIDE 50 MG/ML
12.5 INJECTION, SOLUTION INTRAMUSCULAR; INTRAVENOUS ONCE AS NEEDED
Status: DISCONTINUED | OUTPATIENT
Start: 2023-11-17 | End: 2023-11-17 | Stop reason: HOSPADM

## 2023-11-17 RX ORDER — HYDROMORPHONE HYDROCHLORIDE 1 MG/ML
0.2 INJECTION, SOLUTION INTRAMUSCULAR; INTRAVENOUS; SUBCUTANEOUS EVERY 4 HOURS PRN
Status: DISCONTINUED | OUTPATIENT
Start: 2023-11-17 | End: 2023-11-19 | Stop reason: HOSPADM

## 2023-11-17 RX ORDER — PHENYLEPHRINE HCL IN 0.9% NACL 0.4MG/10ML
SYRINGE (ML) INTRAVENOUS AS NEEDED
Status: DISCONTINUED | OUTPATIENT
Start: 2023-11-17 | End: 2023-11-17

## 2023-11-17 RX ORDER — LIDOCAINE HYDROCHLORIDE AND EPINEPHRINE 10; 10 MG/ML; UG/ML
INJECTION, SOLUTION INFILTRATION; PERINEURAL AS NEEDED
Status: DISCONTINUED | OUTPATIENT
Start: 2023-11-17 | End: 2023-11-17 | Stop reason: HOSPADM

## 2023-11-17 RX ORDER — SODIUM CHLORIDE 9 MG/ML
75 INJECTION, SOLUTION INTRAVENOUS CONTINUOUS
Status: DISCONTINUED | OUTPATIENT
Start: 2023-11-17 | End: 2023-11-19 | Stop reason: HOSPADM

## 2023-11-17 RX ORDER — VIT C/E/ZN/COPPR/LUTEIN/ZEAXAN 250MG-90MG
25 CAPSULE ORAL DAILY
COMMUNITY

## 2023-11-17 RX ORDER — PROPOFOL 10 MG/ML
INJECTION, EMULSION INTRAVENOUS AS NEEDED
Status: DISCONTINUED | OUTPATIENT
Start: 2023-11-17 | End: 2023-11-17

## 2023-11-17 RX ADMIN — ESMOLOL HYDROCHLORIDE 20 MG: 100 INJECTION, SOLUTION INTRAVENOUS at 11:30

## 2023-11-17 RX ADMIN — SODIUM CHLORIDE, POTASSIUM CHLORIDE, SODIUM LACTATE AND CALCIUM CHLORIDE: 600; 310; 30; 20 INJECTION, SOLUTION INTRAVENOUS at 09:15

## 2023-11-17 RX ADMIN — ACETAMINOPHEN 650 MG: 325 TABLET ORAL at 21:34

## 2023-11-17 RX ADMIN — ESMOLOL HYDROCHLORIDE 20 MG: 100 INJECTION, SOLUTION INTRAVENOUS at 09:48

## 2023-11-17 RX ADMIN — Medication 40 MCG: at 11:34

## 2023-11-17 RX ADMIN — ONDANSETRON 4 MG: 2 INJECTION INTRAMUSCULAR; INTRAVENOUS at 12:16

## 2023-11-17 RX ADMIN — LIDOCAINE HYDROCHLORIDE 80 ML: 20 INJECTION, SOLUTION INFILTRATION; PERINEURAL at 08:42

## 2023-11-17 RX ADMIN — FENTANYL CITRATE 50 MCG: 50 INJECTION, SOLUTION INTRAMUSCULAR; INTRAVENOUS at 09:20

## 2023-11-17 RX ADMIN — SODIUM CHLORIDE, POTASSIUM CHLORIDE, SODIUM LACTATE AND CALCIUM CHLORIDE 100 ML/HR: 600; 310; 30; 20 INJECTION, SOLUTION INTRAVENOUS at 13:21

## 2023-11-17 RX ADMIN — OXYCODONE HYDROCHLORIDE 10 MG: 5 TABLET ORAL at 13:43

## 2023-11-17 RX ADMIN — REMIFENTANIL HYDROCHLORIDE 0.06 MCG/KG/MIN: 1 INJECTION, POWDER, LYOPHILIZED, FOR SOLUTION INTRAVENOUS at 10:08

## 2023-11-17 RX ADMIN — Medication 40 MCG: at 11:53

## 2023-11-17 RX ADMIN — PROPOFOL 150 MG: 10 INJECTION, EMULSION INTRAVENOUS at 08:42

## 2023-11-17 RX ADMIN — ACETAMINOPHEN 975 MG: 325 TABLET ORAL at 08:25

## 2023-11-17 RX ADMIN — Medication 40 MCG: at 11:47

## 2023-11-17 RX ADMIN — FENTANYL CITRATE 50 MCG: 50 INJECTION, SOLUTION INTRAMUSCULAR; INTRAVENOUS at 08:42

## 2023-11-17 RX ADMIN — HYDROMORPHONE HYDROCHLORIDE 0.4 MG: 1 INJECTION, SOLUTION INTRAMUSCULAR; INTRAVENOUS; SUBCUTANEOUS at 12:06

## 2023-11-17 RX ADMIN — REMIFENTANIL HYDROCHLORIDE 20 MCG: 1 INJECTION, POWDER, LYOPHILIZED, FOR SOLUTION INTRAVENOUS at 11:12

## 2023-11-17 RX ADMIN — SALINE NASAL SPRAY 2 SPRAY: 1.5 SOLUTION NASAL at 21:34

## 2023-11-17 RX ADMIN — HYDROMORPHONE HYDROCHLORIDE 0.5 MG: 1 INJECTION, SOLUTION INTRAMUSCULAR; INTRAVENOUS; SUBCUTANEOUS at 13:20

## 2023-11-17 RX ADMIN — HYDROMORPHONE HYDROCHLORIDE 0.5 MG: 1 INJECTION, SOLUTION INTRAMUSCULAR; INTRAVENOUS; SUBCUTANEOUS at 13:36

## 2023-11-17 RX ADMIN — Medication 80 MCG: at 12:24

## 2023-11-17 RX ADMIN — SODIUM CHLORIDE, SODIUM LACTATE, POTASSIUM CHLORIDE, AND CALCIUM CHLORIDE: 600; 310; 30; 20 INJECTION, SOLUTION INTRAVENOUS at 08:41

## 2023-11-17 RX ADMIN — CEFAZOLIN SODIUM 2 G: 2 INJECTION, SOLUTION INTRAVENOUS at 19:50

## 2023-11-17 RX ADMIN — ESMOLOL HYDROCHLORIDE 30 MG: 100 INJECTION, SOLUTION INTRAVENOUS at 12:43

## 2023-11-17 RX ADMIN — PROPOFOL 50 MCG/KG/MIN: 10 INJECTION, EMULSION INTRAVENOUS at 09:12

## 2023-11-17 RX ADMIN — HYDROMORPHONE HYDROCHLORIDE 0.5 MG: 1 INJECTION, SOLUTION INTRAMUSCULAR; INTRAVENOUS; SUBCUTANEOUS at 13:26

## 2023-11-17 RX ADMIN — Medication 80 MCG: at 10:26

## 2023-11-17 RX ADMIN — LABETALOL HYDROCHLORIDE 5 MG: 5 INJECTION, SOLUTION INTRAVENOUS at 12:43

## 2023-11-17 RX ADMIN — MIDAZOLAM HYDROCHLORIDE 2 MG: 1 INJECTION, SOLUTION INTRAMUSCULAR; INTRAVENOUS at 08:36

## 2023-11-17 RX ADMIN — SUGAMMADEX 200 MG: 100 INJECTION, SOLUTION INTRAVENOUS at 12:38

## 2023-11-17 RX ADMIN — ESMOLOL HYDROCHLORIDE 15 MG: 100 INJECTION, SOLUTION INTRAVENOUS at 11:23

## 2023-11-17 RX ADMIN — REMIFENTANIL HYDROCHLORIDE 10 MCG: 1 INJECTION, POWDER, LYOPHILIZED, FOR SOLUTION INTRAVENOUS at 11:21

## 2023-11-17 RX ADMIN — CEFAZOLIN 2 G: 1 INJECTION, POWDER, FOR SOLUTION INTRAMUSCULAR; INTRAVENOUS at 09:05

## 2023-11-17 RX ADMIN — ESMOLOL HYDROCHLORIDE 15 MG: 100 INJECTION, SOLUTION INTRAVENOUS at 11:14

## 2023-11-17 RX ADMIN — Medication 60 MCG: at 10:33

## 2023-11-17 RX ADMIN — ROCURONIUM BROMIDE 20 MG: 10 INJECTION, SOLUTION INTRAVENOUS at 09:39

## 2023-11-17 RX ADMIN — Medication 40 MCG: at 11:18

## 2023-11-17 RX ADMIN — Medication 60 MCG: at 12:05

## 2023-11-17 RX ADMIN — Medication 40 MCG: at 11:40

## 2023-11-17 RX ADMIN — SENNOSIDES AND DOCUSATE SODIUM 2 TABLET: 8.6; 5 TABLET ORAL at 21:34

## 2023-11-17 RX ADMIN — Medication 60 MCG: at 12:15

## 2023-11-17 RX ADMIN — Medication 120 MCG: at 10:18

## 2023-11-17 RX ADMIN — ROCURONIUM BROMIDE 60 MG: 10 INJECTION, SOLUTION INTRAVENOUS at 08:42

## 2023-11-17 RX ADMIN — ROCURONIUM BROMIDE 10 MG: 10 INJECTION, SOLUTION INTRAVENOUS at 11:25

## 2023-11-17 RX ADMIN — ACETAMINOPHEN 650 MG: 325 TABLET ORAL at 17:47

## 2023-11-17 RX ADMIN — Medication 60 MCG: at 10:59

## 2023-11-17 RX ADMIN — HYDROMORPHONE HYDROCHLORIDE 0.5 MG: 1 INJECTION, SOLUTION INTRAMUSCULAR; INTRAVENOUS; SUBCUTANEOUS at 13:58

## 2023-11-17 RX ADMIN — Medication 120 MCG: at 09:28

## 2023-11-17 RX ADMIN — Medication 40 MCG: at 11:05

## 2023-11-17 RX ADMIN — PROPOFOL 50 MG: 10 INJECTION, EMULSION INTRAVENOUS at 09:45

## 2023-11-17 SDOH — SOCIAL STABILITY: SOCIAL INSECURITY: DOES ANYONE TRY TO KEEP YOU FROM HAVING/CONTACTING OTHER FRIENDS OR DOING THINGS OUTSIDE YOUR HOME?: NO

## 2023-11-17 SDOH — SOCIAL STABILITY: SOCIAL INSECURITY: ARE THERE ANY APPARENT SIGNS OF INJURIES/BEHAVIORS THAT COULD BE RELATED TO ABUSE/NEGLECT?: NO

## 2023-11-17 SDOH — SOCIAL STABILITY: SOCIAL INSECURITY: ABUSE: ADULT

## 2023-11-17 SDOH — SOCIAL STABILITY: SOCIAL INSECURITY: DO YOU FEEL UNSAFE GOING BACK TO THE PLACE WHERE YOU ARE LIVING?: NO

## 2023-11-17 SDOH — SOCIAL STABILITY: SOCIAL INSECURITY: DO YOU FEEL ANYONE HAS EXPLOITED OR TAKEN ADVANTAGE OF YOU FINANCIALLY OR OF YOUR PERSONAL PROPERTY?: NO

## 2023-11-17 SDOH — SOCIAL STABILITY: SOCIAL INSECURITY: HAS ANYONE EVER THREATENED TO HURT YOUR FAMILY OR YOUR PETS?: NO

## 2023-11-17 SDOH — SOCIAL STABILITY: SOCIAL INSECURITY: ARE YOU OR HAVE YOU BEEN THREATENED OR ABUSED PHYSICALLY, EMOTIONALLY, OR SEXUALLY BY ANYONE?: NO

## 2023-11-17 SDOH — HEALTH STABILITY: MENTAL HEALTH: CURRENT SMOKER: 0

## 2023-11-17 SDOH — SOCIAL STABILITY: SOCIAL INSECURITY: HAVE YOU HAD THOUGHTS OF HARMING ANYONE ELSE?: NO

## 2023-11-17 ASSESSMENT — COGNITIVE AND FUNCTIONAL STATUS - GENERAL
MOBILITY SCORE: 24
MOBILITY SCORE: 24
DAILY ACTIVITIY SCORE: 24
DAILY ACTIVITIY SCORE: 24
PATIENT BASELINE BEDBOUND: NO

## 2023-11-17 ASSESSMENT — LIFESTYLE VARIABLES
HOW MANY STANDARD DRINKS CONTAINING ALCOHOL DO YOU HAVE ON A TYPICAL DAY: 1 OR 2
SKIP TO QUESTIONS 9-10: 1
AUDIT-C TOTAL SCORE: 3
AUDIT-C TOTAL SCORE: 3
PRESCIPTION_ABUSE_PAST_12_MONTHS: NO
HOW OFTEN DO YOU HAVE 6 OR MORE DRINKS ON ONE OCCASION: NEVER
SUBSTANCE_ABUSE_PAST_12_MONTHS: NO
HOW OFTEN DO YOU HAVE A DRINK CONTAINING ALCOHOL: 2-3 TIMES A WEEK

## 2023-11-17 ASSESSMENT — PAIN SCALES - GENERAL
PAINLEVEL_OUTOF10: 7
PAINLEVEL_OUTOF10: 7
PAINLEVEL_OUTOF10: 4
PAINLEVEL_OUTOF10: 0 - NO PAIN
PAINLEVEL_OUTOF10: 8
PAINLEVEL_OUTOF10: 3
PAINLEVEL_OUTOF10: 0 - NO PAIN
PAINLEVEL_OUTOF10: 3
PAINLEVEL_OUTOF10: 8
PAINLEVEL_OUTOF10: 3
PAINLEVEL_OUTOF10: 0 - NO PAIN
PAINLEVEL_OUTOF10: 4
PAINLEVEL_OUTOF10: 7

## 2023-11-17 ASSESSMENT — COLUMBIA-SUICIDE SEVERITY RATING SCALE - C-SSRS
1. IN THE PAST MONTH, HAVE YOU WISHED YOU WERE DEAD OR WISHED YOU COULD GO TO SLEEP AND NOT WAKE UP?: NO
2. HAVE YOU ACTUALLY HAD ANY THOUGHTS OF KILLING YOURSELF?: NO
6. HAVE YOU EVER DONE ANYTHING, STARTED TO DO ANYTHING, OR PREPARED TO DO ANYTHING TO END YOUR LIFE?: NO

## 2023-11-17 ASSESSMENT — PAIN DESCRIPTION - LOCATION: LOCATION: HEAD

## 2023-11-17 ASSESSMENT — ACTIVITIES OF DAILY LIVING (ADL)
HEARING - LEFT EAR: FUNCTIONAL
LACK_OF_TRANSPORTATION: NO
FEEDING YOURSELF: INDEPENDENT
GROOMING: INDEPENDENT
PATIENT'S MEMORY ADEQUATE TO SAFELY COMPLETE DAILY ACTIVITIES?: YES
LACK_OF_TRANSPORTATION: NO
WALKS IN HOME: INDEPENDENT
ASSISTIVE_DEVICE: EYEGLASSES
ADEQUATE_TO_COMPLETE_ADL: YES
HEARING - RIGHT EAR: FUNCTIONAL
BATHING: INDEPENDENT
TOILETING: INDEPENDENT
JUDGMENT_ADEQUATE_SAFELY_COMPLETE_DAILY_ACTIVITIES: YES
DRESSING YOURSELF: INDEPENDENT

## 2023-11-17 ASSESSMENT — PATIENT HEALTH QUESTIONNAIRE - PHQ9
SUM OF ALL RESPONSES TO PHQ9 QUESTIONS 1 & 2: 0
1. LITTLE INTEREST OR PLEASURE IN DOING THINGS: NOT AT ALL
2. FEELING DOWN, DEPRESSED OR HOPELESS: NOT AT ALL

## 2023-11-17 NOTE — ANESTHESIA PROCEDURE NOTES
Airway  Date/Time: 11/17/2023 8:45 AM  Urgency: elective    Airway not difficult    Staffing  Performed: resident   Authorized by: Anatoliy Solorzano MD PhD    Performed by: Samara Moses MD  Patient location during procedure: OR    Indications and Patient Condition  Indications for airway management: anesthesia  Spontaneous ventilation: present  Sedation level: deep  Preoxygenated: yes  Patient position: sniffing  MILS not maintained throughout  Mask difficulty assessment: 1 - vent by mask  Planned trial extubation    Final Airway Details  Final airway type: endotracheal airway      Successful airway: ETT  Cuffed: yes   Successful intubation technique: direct laryngoscopy  Facilitating devices/methods: intubating stylet  Endotracheal tube insertion site: oral  Blade: Catarino  Blade size: #3  ETT size (mm): 7.0  Cormack-Lehane Classification: grade IIa - partial view of glottis  Placement verified by: chest auscultation and capnometry   Measured from: teeth  ETT to teeth (cm): 20  Number of attempts at approach: 1  Number of other approaches attempted: 0
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
## 2023-11-17 NOTE — ANESTHESIA PREPROCEDURE EVALUATION
Patient: Shari Guerra    Procedure Information       Date/Time: 11/17/23 0830    Procedures:       Excision Transsphenoidal Endoscopy Pituitary with Navigation      Excision Transsphenoidal Endoscopy Pituitary with Navigation - 32325-16      Navigation-Assisted Surgery    Location: [facility] OR 25 / [facility] OR    Surgeons: Anoop Lanza MD; Yosvany Barnard MD            Relevant Problems   Anesthesia (within normal limits)      Cardiovascular  Not on hy[ertension medications    (+) HTN (hypertension)   (+) Hyperlipidemia      Endocrine (within normal limits)      GI (within normal limits)      /Renal (within normal limits)      Neuro/Psych (within normal limits)      Pulmonary (within normal limits)      GI/Hepatic (within normal limits)      Hematology (within normal limits)      Musculoskeletal (within normal limits)      Eyes, Ears, Nose, and Throat (within normal limits)      Infectious Disease (within normal limits)       Clinical information reviewed:   Tobacco  Allergies  Meds   Med Hx  Surg Hx  OB Status  Fam Hx  Soc   Hx        NPO Detail:  NPO/Void Status  Carbonhydrate Drink Given Prior to Surgery? : N  Date of Last Liquid: 11/17/23  Time of Last Liquid: 0400  Date of Last Solid: 11/16/23  Time of Last Solid: 1700  Last Intake Type: Clear fluids  Time of Last Void: 0617         Physical Exam    Airway  Mallampati: II  TM distance: >3 FB  Neck ROM: full     Cardiovascular - normal exam     Dental - normal exam     Pulmonary - normal exam     Abdominal - normal exam           Anesthesia Plan    ASA 2     general     The patient is not a current smoker.  Patient was not previously instructed to abstain from smoking on day of procedure.  Patient did not smoke on day of procedure.    intravenous induction   Postoperative administration of opioids is intended.  Anesthetic plan and risks discussed with patient.  Use of blood products discussed with patient who consented to  blood products.    Plan discussed with attending and resident.  Attending Anesthesiologist Note  Above information reviewed including relevant HPI, PMHx, PSHx, anesthesia history, labs, and imaging.    Summary:  63-year-old female scheduled for transsphenoidal endoscopy with excision of non-secreting asymptomatic pituitary adenoma found on imaging.  PMHx fatty liver, GERD, hyperlipidemia, osteoporosis.     Recent Labs:    Chemistry    Lab Results   Component Value Date/Time     11/06/2023 0944    K 4.5 11/06/2023 0944     11/06/2023 0944    CO2 23 11/06/2023 0944    BUN 16 11/06/2023 0944    CREATININE 0.76 11/06/2023 0944    Lab Results   Component Value Date/Time    CALCIUM 10.2 11/06/2023 0944    ALKPHOS 144 (H) 11/06/2023 0944    AST 42 (H) 11/06/2023 0944    ALT 50 (H) 11/06/2023 0944    BILITOT 0.9 11/06/2023 0944          Lab Results   Component Value Date/Time    WBC 8.7 11/06/2023 0944    HGB 14.4 11/06/2023 0944    HCT 44.1 11/06/2023 0944     11/06/2023 0944     Lab Results   Component Value Date/Time    PROTIME 12.0 11/06/2023 0944    INR 1.1 11/06/2023 0944     Encounter Date: 11/06/23   ECG 12 Lead   Result Value    Ventricular Rate 68    Atrial Rate 68    TN Interval 128    QRS Duration 68    QT Interval 412    QTC Calculation(Bazett) 438    P Axis -8    R Axis -18    T Axis -2    QRS Count 11    Q Onset 216    P Onset 152    P Offset 201    T Offset 422    QTC Fredericia 429    Narrative    Normal sinus rhythm  Minimal voltage criteria for LVH, may be normal variant  Nonspecific T wave abnormality  No previous ECGs available  Confirmed by Shun Menon ([id number]) on 11/8/2023 7:25:44 PM       I discussed the anesthesia plan with the patient and/or family and reviewed the risks, benefits and alternatives. Agree to proceed.

## 2023-11-17 NOTE — PROGRESS NOTES
Pharmacy Medication History Review    Shari Guerra is a 63 y.o. female admitted for Pituitary adenoma (CMS/[facility]). Pharmacy reviewed the patient's [id number] medications and allergies for accuracy.    The list below reflects the updated PTA list. Comments regarding how patient may be taking medications differently can be found in the Admit Orders Activity  Prior to Admission Medications   Prescriptions Last Dose Informant Patient Reported? Taking?   aspirin 81 mg EC tablet Past Week Self Yes No   Sig: Take 1 tablet (81 mg) by mouth 3 (three) times a week. On Monday, Wednesday, and Friday   atorvastatin (Lipitor) 20 mg tablet 11/17/2023 Self Yes No   Sig: Take 1 tablet (20 mg) by mouth once daily.   chlorhexidine (Hibiclens) 4 % external liquid   No No   Sig: Apply topically 2 times a day for 5 days.   chlorhexidine (Peridex) 0.12 % solution  Self No No   Sig: Swish and spit 15 mL night before surgery and morning of surgery   cholecalciferol (Vitamin D-3) 25 MCG (1000 UT) capsule Past Week Self Yes No   Sig: Take 1 capsule (25 mcg) by mouth once daily.   citalopram (CeleXA) 20 mg tablet 11/17/2023 Self Yes No   Sig: Take 1 tablet (20 mg) by mouth once daily.   dexAMETHasone (Decadron) 1 mg tablet   No No   Sig: Take 1 tablet when directed   pantoprazole (ProtoNix) 40 mg EC tablet 11/17/2023 Self Yes No   Sig: Take 1 tablet (40 mg) by mouth once daily in the morning. Take before meals.   valACYclovir (Valtrex) 500 mg tablet Past Month Self Yes No   Sig: Take 1 tablet (500 mg) by mouth 2 times a day. As needed for cold sore breakouts      Facility-Administered Medications: None        The list below reflects the updated allergy list. Please review each documented allergy for additional clarification and justification.  Allergies  Reviewed by Layla Munguia, PharmD on 11/17/2023   No Known Allergies         Patient was unable to be assessed for M2B at discharge. Pharmacy has been updated to [facility]. M2B service  not offered prior to surgery, please reassess prior to patient discharge if Meds to Beds is desired.    Sources used to complete the med history include: Patient interview - good historian of medications/ Pharmacy - [employer], Ana Lilia/ Chart review    Layla Munguia PharmD  Transitions of Care Pharmacist  [facility]  Please reach out via Secure Chat for questions, or if no response call [facility] or [facility]

## 2023-11-17 NOTE — ANESTHESIA POSTPROCEDURE EVALUATION
Patient: Shari Guerra    Procedure Summary       Date: 11/17/23 Room / Location: [facility] OR 25 / [facility] OR    Anesthesia Start: 0836 Anesthesia Stop:     Procedures:       Excision Transsphenoidal Endoscopy Pituitary with Navigation      Excision Transsphenoidal Endoscopy Pituitary with Navigation      Navigation-Assisted Surgery Diagnosis:       Pituitary adenoma (CMS/HCC)      (Pituitary adenoma (CMS/HCC) [D35.2])    Surgeons: Anoop Lanza MD; Yosvany Barnard MD Responsible Provider: Anatoliy Solorzano MD PhD    Anesthesia Type: general ASA Status: 2            Anesthesia Type: general    Vitals Value Taken Time   /90 11/17/23 1250   Temp 36 11/17/23 1250   Pulse 74 11/17/23 1250   Resp 16 11/17/23 1250   SpO2 96 11/17/23 1250       Anesthesia Post Evaluation    Patient location during evaluation: PACU  Patient participation: complete - patient participated  Level of consciousness: agitated  Pain management: adequate  Airway patency: patent  Cardiovascular status: acceptable  Respiratory status: acceptable and face mask  Hydration status: acceptable  Postoperative Nausea and Vomiting: none        No notable events documented.

## 2023-11-17 NOTE — OP NOTE
Excision Transsphenoidal Endoscopy Pituitary with Navigation Operative Note     Date: 2023  OR Location: [facility] OR    Name: Shari Guerra, : 1960, Age: 63 y.o., MRN: [id number], Sex: female    Diagnosis  Pre-op Diagnosis     * Pituitary adenoma (CMS/HCC) [D35.2] Post-op Diagnosis     * Pituitary adenoma (CMS/HCC) [D35.2]     Procedures  Excision Transsphenoidal Endoscopy Pituitary with Navigation  11057 - [place] NUNDSC ICRA EXC PITUITRY CHAYA TRNSNSL/SPHENOID    Excision Transsphenoidal Endoscopy Pituitary with Navigation  89244 - [place] NUNDSC ICRA EXC PITUITRY CHAYA TRNSNSL/SPHENOID    Excision Transsphenoidal Endoscopy Pituitary with Navigation  21217 - [place] GRAFTING OF AUTOLOGOUS SOFT TISS BY DIRECT EXC    Excision Transsphenoidal Endoscopy Pituitary with Navigation  96739 - [place] MUSC MYOQ/FSCQ FLAP HEAD&NECK W/NAMED VASC PEDCL    Navigation-Assisted Surgery  79163 - [place] STRTCTC CPTR ASSTD PX EXTRADURAL CRANIAL    [place] MICROSURG TQS REQ USE OPERATING MICROSCOPE [05072]  [place] STRTCTC CPTR ASSTD PX EXTRADURAL CRANIAL [13790]  [place] NUNDSC ICRA EXC PITUITRY CHAYA TRNSNSL/SPHENOID [19599]  Surgeons   Panel 1:     * Anoop Lanza - Primary  Panel 2:     * Yosvany Barnard - Primary     * Erum Bowling    Resident/Fellow/Other Assistant:  Surgeon(s) and Role:  Panel 1:     * Anderson Sanchez MD - Resident - Assisting  Panel 2:     * Erum Bowling MD    Procedure Summary  Anesthesia: General  ASA: II  Anesthesia Staff: Anesthesiologist: Anatoliy Solorzano MD PhD  C-AA: GAYEL Leija  Anesthesia Resident: Samara Moses MD  Estimated Blood Loss: mL  Intra-op Medications:   Medication Name Total Dose   oxymetazoline (Afrin) 0.05 % nasal spray 3 spray   lidocaine-epinephrine (Xylocaine W/EPI) 1 %-1:100,000 injection 4 mL   thrombin solution 5,000 Units   sodium chloride 0.9 % irrigation solution 2,000 mL   HYDROmorphone (Dilaudid) injection 0.5 mg 1.5 mg   labetaloL (Normodyne,Trandate) injection 10 mg 5 mg    lactated Ringer's infusion 56.67 mL   oxyCODONE (Roxicodone) immediate release tablet 10 mg 10 mg              Anesthesia Record               Intraprocedure I/O Totals          Intake    LR 1900.00 mL    Propofol Drip 0.00 mL    The total shown is the total volume documented since Anesthesia Start was filed.    lactated Ringer's 313.75 mL    Total Intake 2213.75 mL       Output    Urine 280 mL    Est. Blood Loss 125 mL    Total Output 405 mL       Net    Net Volume 1808.75 mL          Specimen:   ID Type Source Tests Collected by Time   1 : Pituitary tumor Tissue PITUITARY SURGICAL PATHOLOGY EXAM Anoop Lanza MD 11/17/2023 1252        Staff:   Circulator: Jin Rueda RN  Relief Circulator: Mirta Llanes RN  Scrub Person: Sergio Mata; Mirta Llanes RN         Drains and/or Catheters:   Urethral Catheter Non-latex 16 Fr. (Active)   Output (mL) 200 mL 11/17/23 1348       Tourniquet Times:         Implants:     Findings:     Indications: Shari Guerra is an 63 y.o. female who is having surgery for Pituitary adenoma (CMS/[facility]) [D35.2].     The patient was seen in the preoperative area. The risks, benefits, complications, treatment options, non-operative alternatives, expected recovery and outcomes were discussed with the patient. The possibilities of reaction to medication, pulmonary aspiration, injury to surrounding structures, bleeding, recurrent infection, the need for additional procedures, failure to diagnose a condition, and creating a complication requiring transfusion or operation were discussed with the patient. The patient concurred with the proposed plan, giving informed consent.  The site of surgery was properly noted/marked if necessary per policy. The patient has been actively warmed in preoperative area. Preoperative antibiotics  Venous thrombosis prophylaxis     Procedure Details: Patient was positioned for endonasal endoscopic resection of pituitary macroadenoma  with approach performed Dr. Torin Barnard dictated separately in his note using bimanual endoscopic endonasal techniques the dura was opened in the pituitary region using intradural neuro navigation and obvious macroadenoma was then encountered specimens were sent for pathologic analysis careful preservation of the normal pituitary gland was performed there was no evidence of CSF leak following resection of the pituitary macroadenoma the wound was closed using typical techniques with Dr. Barnadr dictating end of operative note on Shari Guerra thank you  Complications:      Disposition:   Condition:          Additional Details:     Attending Attestation:     Anoop Lanza  Phone Number: [phone]

## 2023-11-17 NOTE — BRIEF OP NOTE
Date: 2023  OR Location: [facility] OR    Name: Shari Guerra, : 1960, Age: 63 y.o., MRN: [id number], Sex: female    Diagnosis  Pre-op Diagnosis     * Pituitary adenoma (CMS/HCC) [D35.2] Post-op Diagnosis     * Pituitary adenoma (CMS/HCC) [D35.2]     Procedures  Excision Transsphenoidal Endoscopy Pituitary with Navigation  33819 - [place] NUNDSC ICRA EXC PITUITRY CHAYA TRNSNSL/SPHENOID    Excision Transsphenoidal Endoscopy Pituitary with Navigation  28651 - [place] NUNDSC ICRA EXC PITUITRY CHAYA TRNSNSL/SPHENOID    Excision Transsphenoidal Endoscopy Pituitary with Navigation  56758 - [place] GRAFTING OF AUTOLOGOUS SOFT TISS BY DIRECT EXC    Excision Transsphenoidal Endoscopy Pituitary with Navigation  24192 - [place] MUSC MYOQ/FSCQ FLAP HEAD&NECK W/NAMED VASC PEDCL    Navigation-Assisted Surgery  26108 - [place] STRTCTC CPTR ASSTD PX EXTRADURAL CRANIAL    [place] MICROSURG TQS REQ USE OPERATING MICROSCOPE [14935]  [place] STRTCTC CPTR ASSTD PX EXTRADURAL CRANIAL [54760]  [place] NUNDSC ICRA EXC PITUITRY CHAYA TRNSNSL/SPHENOID [05868]  Surgeons   Panel 1:     * Anoop Lanza - Primary  Panel 2:     * Yosvany Barnard - Primary     * Erum Bowling    Resident/Fellow/Other Assistant:  Surgeon(s) and Role:  Panel 1:     * Anderson Sanchez MD - Resident - Assisting  Panel 2:     * Erum Bowling MD    Procedure Summary  Anesthesia: General  ASA: II  Anesthesia Staff: Anesthesiologist: Anatoliy Solorzano MD PhD  Anesthesia Resident: Samara Moses MD  Estimated Blood Loss: 25mL  Intra-op Medications:   Medication Name Total Dose   oxymetazoline (Afrin) 0.05 % nasal spray 3 spray   lidocaine-epinephrine (Xylocaine W/EPI) 1 %-1:100,000 injection 4 mL   thrombin solution 5,000 Units   sodium chloride 0.9 % irrigation solution 2,000 mL              Anesthesia Record               Intraprocedure I/O Totals          Intake    LR 1000.00 mL    Propofol Drip 0.00 mL    The total shown is the total volume documented since Anesthesia Start was filed.     Total Intake 1000 mL       Output    Urine 250 mL    Total Output 250 mL       Net    Net Volume 750 mL          Specimen: No specimens collected     Staff:   Circulator: Jin Rueda RN  Relief Circulator: Mirta Llanes RN  Scrub Person: Sergio Mata; Mirta Llanes RN          Findings: good resection of pituitary macroadenoma    Complications:  None; patient tolerated the procedure well.     Disposition: PACU - hemodynamically stable.  Condition: stable  Specimens Collected: No specimens collected  Attending Attestation:     Anoop Lanza  Phone Number: [phone]

## 2023-11-17 NOTE — ANESTHESIA PROCEDURE NOTES
Peripheral IV  Date/Time: 11/17/2023 8:57 AM      Placement  Needle size: 18 G  Laterality: left  Location: forearm  Local anesthetic: none  Site prep: chlorhexidine  Technique: anatomical landmarks  Attempts: 1

## 2023-11-17 NOTE — BRIEF OP NOTE
Date: 2023  OR Location: [facility] OR    Name: Shari Guerra, : 1960, Age: 63 y.o., MRN: [id number], Sex: female    Diagnosis  Pre-op Diagnosis     * Pituitary adenoma (CMS/HCC) [D35.2] Post-op Diagnosis     * Pituitary adenoma (CMS/HCC) [D35.2]     Procedures  Excision Transsphenoidal Endoscopy Pituitary with Navigation  89773 - [place] NUNDSC ICRA EXC PITUITRY CHAYA TRNSNSL/SPHENOID    Excision Transsphenoidal Endoscopy Pituitary with Navigation  30417 - [place] NUNDSC ICRA EXC PITUITRY CHAYA TRNSNSL/SPHENOID    Excision Transsphenoidal Endoscopy Pituitary with Navigation  80715 - [place] GRAFTING OF AUTOLOGOUS SOFT TISS BY DIRECT EXC    Excision Transsphenoidal Endoscopy Pituitary with Navigation  57881 - [place] MUSC MYOQ/FSCQ FLAP HEAD&NECK W/NAMED VASC PEDCL    Navigation-Assisted Surgery  76204 - [place] STRTCTC CPTR ASSTD PX EXTRADURAL CRANIAL    [place] MICROSURG TQS REQ USE OPERATING MICROSCOPE [74703]  [place] STRTCTC CPTR ASSTD PX EXTRADURAL CRANIAL [58659]  [place] NUNDSC ICRA EXC PITUITRY CHAYA TRNSNSL/SPHENOID [32792]  Surgeons   Panel 1:     * Anoop Lanza - Primary  Panel 2:     * Yosvany Barnard - Primary     * Erum Bowling    Resident/Fellow/Other Assistant:  Surgeon(s) and Role:  Panel 1:     * Anderson Sanchez MD - Resident - Assisting  Panel 2:     * Erum Bowling MD    Procedure Summary  Anesthesia: General  ASA: II  Anesthesia Staff: Anesthesiologist: Anatoliy Solorzano MD PhD  C-AA: GAYLE Leija  Anesthesia Resident: Samara Moses MD  Estimated Blood Loss: 25 mL  Intra-op Medications:   Medication Name Total Dose   oxymetazoline (Afrin) 0.05 % nasal spray 3 spray   lidocaine-epinephrine (Xylocaine W/EPI) 1 %-1:100,000 injection 4 mL   thrombin solution 5,000 Units   sodium chloride 0.9 % irrigation solution 2,000 mL   labetaloL (Normodyne,Trandate) injection 10 mg 5 mg              Anesthesia Record               Intraprocedure I/O Totals          Intake    LR 1900.00 mL    Propofol Drip 0.00 mL     The total shown is the total volume documented since Anesthesia Start was filed.    lactated Ringer's 313.75 mL    Total Intake 2213.75 mL       Output    Urine 280 mL    Est. Blood Loss 125 mL    Total Output 405 mL       Net    Net Volume 1808.75 mL          Specimen:   ID Type Source Tests Collected by Time   1 :  Tissue PITUITARY SURGICAL PATHOLOGY EXAM Anoop Lanza MD 11/17/2023 1252        Staff:   Circulator: Jin Rueda RN  Relief Circulator: Mirta Llanes RN  Scrub Person: Sergio Mata; Mirta Llanes RN    Findings: Closed with floseal, free graft, surgicel, and nasopore    Complications:  None; patient tolerated the procedure well.     Disposition: PACU - hemodynamically stable.  Condition: stable  Specimens Collected:   ID Type Source Tests Collected by Time   1 :  Tissue PITUITARY SURGICAL PATHOLOGY EXAM Anoop Lanza MD 11/17/2023 1252     Attending Attestation:     Anoop Lanza  Phone Number: [phone]

## 2023-11-17 NOTE — ANESTHESIA PROCEDURE NOTES
Peripheral IV  Date/Time: 11/17/2023 7:00 AM      Placement  Needle size: 18 G  Laterality: left  Location: hand  Local anesthetic: injectable  Site prep: chlorhexidine  Technique: anatomical landmarks  Attempts: 1

## 2023-11-17 NOTE — HOSPITAL COURSE
63F with h/o GERD, HLD, osteoporosis, WILLIAMSON p/w incidental sellar mass found after concussion workup, 11/17 s/p endoscopic endonasal transphenoidal approach for sellar mass resection

## 2023-11-17 NOTE — ANESTHESIA PROCEDURE NOTES
Arterial Line:    Date/Time: 11/17/2023 8:50 AM    Staffing  Performed: resident   Authorized by: Anatoliy Solorzano MD PhD    Performed by: Samara Moses MD    An arterial line was placed. Procedure performed using surface landmarks.in the OR for the following indication(s): continuous blood pressure monitoring.    A 20 gauge (size), 8 cm (length), Angiocath (type) catheter was placed into the Left radial artery, secured by tape,   Seldinger technique not used.  Events:  patient tolerated procedure well with no complications.

## 2023-11-17 NOTE — PROGRESS NOTES
"Shari Guerra is a 63 y.o. female on day 0 of admission presenting with Pituitary adenoma (CMS/HCC).    Subjective   ***   I have reviewed histories, allergies and medications have been reviewed and there are no changes       Objective   Review of Systems  Physical Exam    Last Recorded Vitals  Blood pressure 140/68, pulse 67, temperature 36 °C (96.8 °F), temperature source Temporal, resp. rate 12, height 1.63 m (5' 4.17\"), weight 80.9 kg (178 lb 5.6 oz), SpO2 100 %.  Intake/Output last 3 Shifts:  No intake/output data recorded.    Relevant Results  Results from last 7 days   Lab Units 11/17/23  0727   GLUCOSE mg/dL 105*     Pump Settings  Basal Rates: ***  Insulin to Carb Ratio: ***  Insulin Sensitivity Factor: ***  Targets: ***  Duration of Insulin Action: ***       {If you would like to pull in Medications, type .meds     If you would like to pull in Lab results for the last 24 hours, type .[id number]    If you would like to pull in Imaging results, type .imgrslt :99}    {Link to Stroke Scoring tools - Link :99}               Assessment/Plan   Principal Problem:    Pituitary adenoma (CMS/HCC)  Active Problems:    HTN (hypertension)    Macroadenoma    ***     {This patient does not have an ACP note on file for this encounter, please fill one out - Advance Care Planning Activity :99}  I spent *** minutes in the professional and overall care of this patient.      Julio Cesar Velazco MD    "

## 2023-11-18 LAB
ALBUMIN SERPL BCP-MCNC: 4 G/DL (ref 3.4–5)
ALBUMIN SERPL BCP-MCNC: 4.1 G/DL (ref 3.4–5)
ANION GAP SERPL CALC-SCNC: 14 MMOL/L (ref 10–20)
ANION GAP SERPL CALC-SCNC: 22 MMOL/L (ref 10–20)
BUN SERPL-MCNC: 11 MG/DL (ref 6–23)
BUN SERPL-MCNC: 14 MG/DL (ref 6–23)
CALCIUM SERPL-MCNC: 8.9 MG/DL (ref 8.6–10.6)
CALCIUM SERPL-MCNC: 9.3 MG/DL (ref 8.6–10.6)
CHLORIDE SERPL-SCNC: 108 MMOL/L (ref 98–107)
CHLORIDE SERPL-SCNC: 112 MMOL/L (ref 98–107)
CO2 SERPL-SCNC: 17 MMOL/L (ref 21–32)
CO2 SERPL-SCNC: 24 MMOL/L (ref 21–32)
CORTIS AM PEAK SERPL-MSCNC: 17.2 UG/DL (ref 5–20)
CORTIS SERPL-MCNC: 6.2 UG/DL (ref 2.5–20)
CREAT SERPL-MCNC: 0.66 MG/DL (ref 0.5–1.05)
CREAT SERPL-MCNC: 0.69 MG/DL (ref 0.5–1.05)
ERYTHROCYTE [DISTWIDTH] IN BLOOD BY AUTOMATED COUNT: 14.2 % (ref 11.5–14.5)
GFR SERPL CREATININE-BSD FRML MDRD: >90 ML/MIN/1.73M*2
GFR SERPL CREATININE-BSD FRML MDRD: >90 ML/MIN/1.73M*2
GLUCOSE SERPL-MCNC: 102 MG/DL (ref 74–99)
GLUCOSE SERPL-MCNC: 127 MG/DL (ref 74–99)
HCT VFR BLD AUTO: 35.9 % (ref 36–46)
HGB BLD-MCNC: 12 G/DL (ref 12–16)
MCH RBC QN AUTO: 28.9 PG (ref 26–34)
MCHC RBC AUTO-ENTMCNC: 33.4 G/DL (ref 32–36)
MCV RBC AUTO: 87 FL (ref 80–100)
NRBC BLD-RTO: 0 /100 WBCS (ref 0–0)
PHOSPHATE SERPL-MCNC: 3.4 MG/DL (ref 2.5–4.9)
PHOSPHATE SERPL-MCNC: 3.5 MG/DL (ref 2.5–4.9)
PLATELET # BLD AUTO: 251 X10*3/UL (ref 150–450)
POTASSIUM SERPL-SCNC: 4 MMOL/L (ref 3.5–5.3)
POTASSIUM SERPL-SCNC: 4.1 MMOL/L (ref 3.5–5.3)
PROLACTIN SERPL-MCNC: 3 UG/L (ref 3–20)
RBC # BLD AUTO: 4.15 X10*6/UL (ref 4–5.2)
SODIUM SERPL-SCNC: 142 MMOL/L (ref 136–145)
SODIUM SERPL-SCNC: 147 MMOL/L (ref 136–145)
SP GR UR STRIP.AUTO: 1.01
WBC # BLD AUTO: 11.7 X10*3/UL (ref 4.4–11.3)

## 2023-11-18 PROCEDURE — 2500000001 HC RX 250 WO HCPCS SELF ADMINISTERED DRUGS (ALT 637 FOR MEDICARE OP): Performed by: STUDENT IN AN ORGANIZED HEALTH CARE EDUCATION/TRAINING PROGRAM

## 2023-11-18 PROCEDURE — 84146 ASSAY OF PROLACTIN: CPT

## 2023-11-18 PROCEDURE — 80069 RENAL FUNCTION PANEL: CPT

## 2023-11-18 PROCEDURE — 97161 PT EVAL LOW COMPLEX 20 MIN: CPT | Mod: GP | Performed by: PHYSICAL THERAPIST

## 2023-11-18 PROCEDURE — 80069 RENAL FUNCTION PANEL: CPT | Performed by: STUDENT IN AN ORGANIZED HEALTH CARE EDUCATION/TRAINING PROGRAM

## 2023-11-18 PROCEDURE — 2500000004 HC RX 250 GENERAL PHARMACY W/ HCPCS (ALT 636 FOR OP/ED): Performed by: STUDENT IN AN ORGANIZED HEALTH CARE EDUCATION/TRAINING PROGRAM

## 2023-11-18 PROCEDURE — 85027 COMPLETE CBC AUTOMATED: CPT | Performed by: STUDENT IN AN ORGANIZED HEALTH CARE EDUCATION/TRAINING PROGRAM

## 2023-11-18 PROCEDURE — 36415 COLL VENOUS BLD VENIPUNCTURE: CPT | Performed by: STUDENT IN AN ORGANIZED HEALTH CARE EDUCATION/TRAINING PROGRAM

## 2023-11-18 PROCEDURE — 82533 TOTAL CORTISOL: CPT | Performed by: STUDENT IN AN ORGANIZED HEALTH CARE EDUCATION/TRAINING PROGRAM

## 2023-11-18 PROCEDURE — 99232 SBSQ HOSP IP/OBS MODERATE 35: CPT | Performed by: STUDENT IN AN ORGANIZED HEALTH CARE EDUCATION/TRAINING PROGRAM

## 2023-11-18 PROCEDURE — 1100000001 HC PRIVATE ROOM DAILY

## 2023-11-18 PROCEDURE — 36415 COLL VENOUS BLD VENIPUNCTURE: CPT

## 2023-11-18 PROCEDURE — 82533 TOTAL CORTISOL: CPT

## 2023-11-18 PROCEDURE — 81003 URINALYSIS AUTO W/O SCOPE: CPT | Performed by: STUDENT IN AN ORGANIZED HEALTH CARE EDUCATION/TRAINING PROGRAM

## 2023-11-18 RX ADMIN — SALINE NASAL SPRAY 2 SPRAY: 1.5 SOLUTION NASAL at 17:34

## 2023-11-18 RX ADMIN — ACETAMINOPHEN 650 MG: 325 TABLET ORAL at 16:10

## 2023-11-18 RX ADMIN — PANTOPRAZOLE SODIUM 40 MG: 40 TABLET, DELAYED RELEASE ORAL at 07:44

## 2023-11-18 RX ADMIN — ACETAMINOPHEN 650 MG: 325 TABLET ORAL at 10:05

## 2023-11-18 RX ADMIN — ACETAMINOPHEN 650 MG: 325 TABLET ORAL at 04:49

## 2023-11-18 RX ADMIN — ATORVASTATIN CALCIUM 20 MG: 20 TABLET, FILM COATED ORAL at 08:31

## 2023-11-18 RX ADMIN — SALINE NASAL SPRAY 2 SPRAY: 1.5 SOLUTION NASAL at 14:41

## 2023-11-18 RX ADMIN — SALINE NASAL SPRAY 2 SPRAY: 1.5 SOLUTION NASAL at 07:00

## 2023-11-18 RX ADMIN — CITALOPRAM HYDROBROMIDE 20 MG: 20 TABLET ORAL at 08:31

## 2023-11-18 RX ADMIN — ACETAMINOPHEN 650 MG: 325 TABLET ORAL at 22:11

## 2023-11-18 RX ADMIN — SALINE NASAL SPRAY 2 SPRAY: 1.5 SOLUTION NASAL at 21:00

## 2023-11-18 RX ADMIN — CEFAZOLIN SODIUM 2 G: 2 INJECTION, SOLUTION INTRAVENOUS at 00:34

## 2023-11-18 ASSESSMENT — COGNITIVE AND FUNCTIONAL STATUS - GENERAL
DAILY ACTIVITIY SCORE: 24
MOBILITY SCORE: 24
MOBILITY SCORE: 24

## 2023-11-18 ASSESSMENT — PAIN SCALES - GENERAL
PAINLEVEL_OUTOF10: 0 - NO PAIN
PAINLEVEL_OUTOF10: 0 - NO PAIN

## 2023-11-18 ASSESSMENT — PAIN - FUNCTIONAL ASSESSMENT
PAIN_FUNCTIONAL_ASSESSMENT: 0-10
PAIN_FUNCTIONAL_ASSESSMENT: 0-10

## 2023-11-18 ASSESSMENT — ACTIVITIES OF DAILY LIVING (ADL): LACK_OF_TRANSPORTATION: NO
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
## 2023-11-18 NOTE — CARE PLAN
The patient's goals for the shift include      The clinical goals for the shift include pt will remain hemodynamically stable post surgery      Problem: Pain  Goal: My pain/discomfort is manageable  Outcome: Progressing     Problem: Safety  Goal: Patient will be injury free during hospitalization  Outcome: Progressing

## 2023-11-18 NOTE — PROGRESS NOTES
Subjective:  No acute events overnight. Patient doing very well this morning. Using saline frequently.    Objective:  Vitals reviewed  General: Alert, oriented, no acute distress  Resp: Breathing comfortably on room air, no stridor  Head: Atraumatic, normocephalic  Oral Cavity: MMM  Nose: No bleeding or drainage    Assessment:  Shari Guerra is a 63 y.o. female with a sellar mass s/p transsphenoidal resection with Hailey Lanza and Akil on 11/17. She is doing well postoperatively.    Plan:  - Continue to use nasal saline spray every 4 hours or more often  - May use afrin as needed for any epistaxis  - Sinus precautions including no nose blowing and sneezing with mouth open    Patient discussed with Dr. Barnard.    Residents to contact during the week between 6am to 5pm below regarding patient related issues for specific attendings. Please reach out via [employer] [facility]. If you are not able to reach a resident in a timely fashion or if any urgent issue, please page.     Kirsten Palacio MD (Esvin Linares Semaan, [place]) - pager [phone]   Ethan Meza MD (Esvin Linares Semaan, [place]) - pager [phone]  Kathryn Cabrera MD (Deandra Dowling, [place] Stephen[place])  - pager [phone]   Erum Bowling MD (Mariana Pal, [place]) - [phone]    On weekends of after 5pm, please page [phone].
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
## 2023-11-18 NOTE — PROGRESS NOTES
"Shari Guerra is a 63 y.o. female on day 1 of admission presenting with Pituitary adenoma (CMS/HCC).    Subjective   Patient doing well post-op, no events overnight       Objective     Physical Exam  Aox3  VFF  5/5x4  SILT    Last Recorded Vitals  Blood pressure 135/81, pulse 81, temperature 37.1 °C (98.8 °F), resp. rate 19, height 1.63 m (5' 4.17\"), weight 80.9 kg (178 lb 5.6 oz), SpO2 95 %.  Intake/Output last 3 Shifts:  I/O last 3 completed shifts:  In: 2463.8 (30.5 mL/kg) [I.V.:563.8 (7 mL/kg); IV Piggyback:1900]  Out: 690 (8.5 mL/kg) [Urine:565 (0.2 mL/kg/hr); Blood:125]  Weight: 80.9 kg     Relevant Results  Lab Results   Component Value Date    WBC 11.7 (H) 11/18/2023    HGB 12.0 11/18/2023    HCT 35.9 (L) 11/18/2023    MCV 87 11/18/2023     11/18/2023     Lab Results   Component Value Date    GLUCOSE 154 (H) 11/17/2023    CALCIUM 9.0 11/17/2023     (L) 11/17/2023    K 3.8 11/17/2023    CO2 23 11/17/2023     11/17/2023    BUN 15 11/17/2023    CREATININE 0.65 11/17/2023         This patient has a urinary catheter   Reason for the urinary catheter remaining today? critically ill patient who need accurate urinary output measurements    Assessment/Plan   Principal Problem:    Pituitary adenoma (CMS/HCC)  Active Problems:    HTN (hypertension)    Macroadenoma    h/o h/o GERD, HLD, osteoporosis, WILLIAMSON p/w incidental sellar mass found after concussion workup, 11/17 s/p EEA TSR    STEPHANIE  Keep cannon, strict I/O  Endo recs  Endo labs  SCDs           Codey Dutton MD      "

## 2023-11-19 VITALS
BODY MASS INDEX: 30.45 KG/M2 | DIASTOLIC BLOOD PRESSURE: 85 MMHG | HEART RATE: 79 BPM | RESPIRATION RATE: 18 BRPM | WEIGHT: 178.35 LBS | HEIGHT: 64 IN | TEMPERATURE: 98.2 F | SYSTOLIC BLOOD PRESSURE: 137 MMHG | OXYGEN SATURATION: 92 %

## 2023-11-19 LAB
ACTH PLAS-MCNC: 105 PG/ML (ref 7.2–63.3)
ACTH PLAS-MCNC: 179 PG/ML (ref 7.2–63.3)
ACTH PLAS-MCNC: 28.3 PG/ML (ref 7.2–63.3)
ACTH PLAS-MCNC: 43.7 PG/ML (ref 7.2–63.3)
ALBUMIN SERPL BCP-MCNC: 4.4 G/DL (ref 3.4–5)
ANION GAP SERPL CALC-SCNC: 17 MMOL/L (ref 10–20)
BUN SERPL-MCNC: 16 MG/DL (ref 6–23)
CALCIUM SERPL-MCNC: 9.5 MG/DL (ref 8.6–10.6)
CHLORIDE SERPL-SCNC: 109 MMOL/L (ref 98–107)
CO2 SERPL-SCNC: 20 MMOL/L (ref 21–32)
CREAT SERPL-MCNC: 0.67 MG/DL (ref 0.5–1.05)
ERYTHROCYTE [DISTWIDTH] IN BLOOD BY AUTOMATED COUNT: 14.4 % (ref 11.5–14.5)
GFR SERPL CREATININE-BSD FRML MDRD: >90 ML/MIN/1.73M*2
GH SERPL-MCNC: 2.14 NG/ML (ref 0.05–8)
GLUCOSE SERPL-MCNC: 113 MG/DL (ref 74–99)
HCT VFR BLD AUTO: 37.8 % (ref 36–46)
HGB BLD-MCNC: 13.4 G/DL (ref 12–16)
IGF BP3 SERPL-MCNC: 3450 NG/ML (ref 2462–5274)
IGF-I SERPL-MCNC: 138 NG/ML (ref 34–241)
IGF-I SERPL-MCNC: 159 NG/ML (ref 34–241)
IGF-I Z-SCORE SERPL: 0.5
IGF-I Z-SCORE SERPL: 0.8
MCH RBC QN AUTO: 30 PG (ref 26–34)
MCHC RBC AUTO-ENTMCNC: 35.4 G/DL (ref 32–36)
MCV RBC AUTO: 85 FL (ref 80–100)
NRBC BLD-RTO: 0 /100 WBCS (ref 0–0)
PHOSPHATE SERPL-MCNC: 3.5 MG/DL (ref 2.5–4.9)
PLATELET # BLD AUTO: 261 X10*3/UL (ref 150–450)
POTASSIUM SERPL-SCNC: 4.7 MMOL/L (ref 3.5–5.3)
RBC # BLD AUTO: 4.47 X10*6/UL (ref 4–5.2)
SODIUM SERPL-SCNC: 141 MMOL/L (ref 136–145)
WBC # BLD AUTO: 11 X10*3/UL (ref 4.4–11.3)

## 2023-11-19 PROCEDURE — 36415 COLL VENOUS BLD VENIPUNCTURE: CPT | Performed by: STUDENT IN AN ORGANIZED HEALTH CARE EDUCATION/TRAINING PROGRAM

## 2023-11-19 PROCEDURE — 82374 ASSAY BLOOD CARBON DIOXIDE: CPT | Performed by: STUDENT IN AN ORGANIZED HEALTH CARE EDUCATION/TRAINING PROGRAM

## 2023-11-19 PROCEDURE — 85027 COMPLETE CBC AUTOMATED: CPT | Performed by: STUDENT IN AN ORGANIZED HEALTH CARE EDUCATION/TRAINING PROGRAM

## 2023-11-19 RX ORDER — ASPIRIN 81 MG/1
81 TABLET ORAL 3 TIMES WEEKLY
Qty: 30 TABLET | Refills: 0 | Status: SHIPPED | OUTPATIENT
Start: 2023-11-24

## 2023-11-19 RX ORDER — OXYCODONE HYDROCHLORIDE 5 MG/1
5 TABLET ORAL EVERY 6 HOURS PRN
Qty: 15 TABLET | Refills: 0 | Status: SHIPPED | OUTPATIENT
Start: 2023-11-19 | End: 2023-11-21 | Stop reason: ALTCHOICE

## 2023-11-19 RX ORDER — ACETAMINOPHEN 500 MG
1000 TABLET ORAL EVERY 6 HOURS PRN
Qty: 30 TABLET | Refills: 0 | Status: SHIPPED | OUTPATIENT
Start: 2023-11-19 | End: 2023-11-27

## 2023-11-19 RX ORDER — DOCUSATE SODIUM 100 MG/1
100 CAPSULE, LIQUID FILLED ORAL 2 TIMES DAILY
Qty: 16 CAPSULE | Refills: 0 | Status: SHIPPED | OUTPATIENT
Start: 2023-11-19 | End: 2023-11-21 | Stop reason: ALTCHOICE

## 2023-11-19 RX ADMIN — SALINE NASAL SPRAY 2 SPRAY: 1.5 SOLUTION NASAL at 07:00

## 2023-11-19 RX ADMIN — ACETAMINOPHEN 650 MG: 325 TABLET ORAL at 04:15

## 2023-11-19 NOTE — PROGRESS NOTES
Subjective:  No acute events overnight. Patient doing very well this morning. Using saline frequently. Ready to go home. No pain. No vision changes.     Objective:  Vitals reviewed  General: Alert, oriented, no acute distress  Resp: Breathing comfortably on room air, no stridor  Head: Atraumatic, normocephalic  Oral Cavity: MMM  Nose: No bleeding or drainage, expected congestion     Assessment:  Shari Guerra is a 63 y.o. female with a sellar mass s/p transsphenoidal resection with Hailey Lanza and Akil on 11/17. She is doing well postoperatively.    Plan:  - Continue to use nasal saline spray every 4 hours or more often   - May use afrin as needed for any epistaxis  - Sinus precautions including no nose blowing and sneezing with mouth open  - OK to discharge from ENT perspective, Dr. Barnard Follow-up scheduled for this week     Patient discussed with Dr. Barnard.    Residents to contact during the week between 6am to 5pm below regarding patient related issues for specific attendings. Please reach out via [employer] [facility]. If you are not able to reach a resident in a timely fashion or if any urgent issue, please page.     Kirsten Palacio MD (Esvin Linares Semaan, [place]) - pager [phone]   Ethan Meza MD (Esvin Linares Semaan, [place]) - pager [phone]  Kathryn Cabrera MD (Deandra Dowling Zhao, [place])  - pager [phone]   Erum Bowling MD (Mariana Pal, [place]) - [phone]    On weekends of after 5pm, please page [phone].    Your discharge plan includes access to our free Care Transitions program.     As your Care Transition Nurse I will contact you via phone within 2 business days after your discharge from the hospital. Please have your discharge instructions and medications ready for review. Over the next 30 days I will call you at least once a week. I am able to assist with arranging follow-up appointments. I have direct contact with your physicians and will alert them with any health or medication concerns or relay your questions.     Your Care Transitions Nurse is Velia Pulido     If you have any questions or concerns after your discharge and prior to our first call, you can reach me at [phone].

## 2023-11-19 NOTE — PROGRESS NOTES
"Shari Guerra is a 63 y.o. female on day 2 of admission presenting with Pituitary adenoma (CMS/HCC).    Subjective   No events overnight    Objective     Physical Exam  Aox3  VFF  5/5x4  SILT    Last Recorded Vitals  Blood pressure 137/85, pulse 79, temperature 36.8 °C (98.2 °F), temperature source Temporal, resp. rate 18, height 1.63 m (5' 4.17\"), weight 80.9 kg (178 lb 5.6 oz), SpO2 92 %.  Intake/Output last 3 Shifts:  I/O last 3 completed shifts:  In: - (0 mL/kg)   Out: 4200 (51.9 mL/kg) [Urine:4200 (1.4 mL/kg/hr)]  Weight: 80.9 kg     Relevant Results  Lab Results   Component Value Date    WBC 11.0 11/19/2023    HGB 13.4 11/19/2023    HCT 37.8 11/19/2023    MCV 85 11/19/2023     11/19/2023     Lab Results   Component Value Date    GLUCOSE 113 (H) 11/19/2023    CALCIUM 9.5 11/19/2023     11/19/2023    K 4.7 11/19/2023    CO2 20 (L) 11/19/2023     (H) 11/19/2023    BUN 16 11/19/2023    CREATININE 0.67 11/19/2023         This patient has a urinary catheter   Reason for the urinary catheter remaining today? critically ill patient who need accurate urinary output measurements    Assessment/Plan   Principal Problem:    Pituitary adenoma (CMS/HCC)  Active Problems:    HTN (hypertension)    Macroadenoma    h/o h/o GERD, HLD, osteoporosis, WILLIAMSON p/w incidental sellar mass found after concussion workup, 11/17 s/p EEA TSR    floor  Endo recs- ok to discharge, prolactin  SCDs, SQH  PTOT-NN    Patient medically ready for dispo    Codey Dutton MD    "

## 2023-11-19 NOTE — DISCHARGE SUMMARY
Discharge Diagnosis  Pituitary adenoma (CMS/HCC)    Issues Requiring Follow-Up  Follow up    Test Results Pending At Discharge  Pending Labs       Order Current Status    ACTH Collected (11/18/23 1438)    ACTH In process    ACTH In process    ACTH In process    ACTH In process    Adrenocorticotropic Hormone (ACTH) In process    DHEA In process    Estradiol LC/MS/MS In process    Growth Hormone In process    Growth hormone In process    Insulin-Like Growth Factor 1 In process    Insulin-like Growth Factor Binding Protein-3 In process    Insulin-like growth factor In process    Surgical Pathology Exam In process    Extra Tubes Preliminary result    Extra Tubes Preliminary result    Extra Tubes Preliminary result    Red Top Preliminary result    SST TOP Preliminary result    Sterile Cup Preliminary result            Hospital Course  63F with h/o GERD, HLD, osteoporosis, WILLIAMSON p/w incidental sellar mass found after concussion workup, 11/17 s/p endoscopic endonasal transphenoidal approach for sellar mass resection    Did well post-operatively and was discharged home with appropriate follow up.    Pertinent Physical Exam At Time of Discharge  Physical Exam  A&Ox3  Follows command x4  SILT  Home Medications     Medication List      START taking these medications     acetaminophen 500 mg tablet; Commonly known as: Tylenol; Take 2 tablets   (1,000 mg) by mouth every 6 hours if needed for mild pain (1 - 3) for up   to 8 days.   docusate sodium 100 mg capsule; Commonly known as: Colace; Take 1   capsule (100 mg) by mouth 2 times a day for 8 days.   oxyCODONE 5 mg immediate release tablet; Commonly known as: Roxicodone;   Take 1 tablet (5 mg) by mouth every 6 hours if needed for severe pain (7 -   10) for up to 8 days.     CHANGE how you take these medications     aspirin 81 mg EC tablet; Take 1 tablet (81 mg) by mouth 3 (three) times   a week. On Monday, Wednesday, and Friday Do not start before November 24, 2023.; Start  taking on: November 24, 2023; What changed: These   instructions start on November 24, 2023. If you are unsure what to do   until then, ask your doctor or other care provider.     CONTINUE taking these medications     atorvastatin 20 mg tablet; Commonly known as: Lipitor   cholecalciferol 25 MCG (1000 UT) capsule; Commonly known as: Vitamin D-3   citalopram 20 mg tablet; Commonly known as: CeleXA   pantoprazole 40 mg EC tablet; Commonly known as: ProtoNix   valACYclovir 500 mg tablet; Commonly known as: Valtrex     STOP taking these medications     chlorhexidine 0.12 % solution; Commonly known as: Peridex   chlorhexidine 4 % external liquid; Commonly known as: Hibiclens   dexAMETHasone 1 mg tablet; Commonly known as: Decadron       Outpatient Follow-Up  Future Appointments   Date Time Provider Department [place]   11/21/2023  3:00 PM Yosvany Barnard MD [id number] [facility]   11/27/2023 10:30 AM Yosvany Barnard MD [id number] [facility]   12/29/2023  3:00 PM MD [email or web address] [facility]   12/29/2023  3:15 PM MD [email or web address] [facility]       Maude Martinez MD

## 2023-11-20 LAB
BLOOD EXPIRATION DATE: NORMAL
BLOOD EXPIRATION DATE: NORMAL
DHEA SERPL-MCNC: 0.59 NG/ML (ref 0.63–4.7)
DISPENSE STATUS: NORMAL
DISPENSE STATUS: NORMAL
GH SERPL-MCNC: 0.12 NG/ML (ref 0.05–8)
PRODUCT BLOOD TYPE: 7300
PRODUCT BLOOD TYPE: 7300
PRODUCT CODE: NORMAL
PRODUCT CODE: NORMAL
UNIT ABO: NORMAL
UNIT ABO: NORMAL
UNIT NUMBER: NORMAL
UNIT NUMBER: NORMAL
UNIT RH: NORMAL
UNIT RH: NORMAL
UNIT VOLUME: 350
UNIT VOLUME: 350
XM INTEP: NORMAL
XM INTEP: NORMAL

## 2023-11-20 ASSESSMENT — PAIN SCALES - GENERAL: PAINLEVEL_OUTOF10: 3

## 2023-11-20 NOTE — OP NOTE
Excision Transsphenoidal Endoscopy Pituitary with Navigation Operative Note     Date: 2023  OR Location: [facility] OR    Name: Shari Guerra, : 1960, Age: 63 y.o., MRN: [id number], Sex: female    Diagnosis  Pre-op Diagnosis     * Pituitary adenoma (CMS/HCC) [D35.2] Post-op Diagnosis     * Pituitary adenoma (CMS/HCC) [D35.2]     Procedures  1.  Endoscopic endonasal transsphenoidal resection of pituitary region neoplasm  2.  Free mucosal graft reconstruction  3.  Extracranial CT image guidance    Surgeons   Panel 1:     * Anoop Lanza - Primary  Panel 2:     * Yosvany Barnard - Primary    Resident/Fellow/Other Assistant:  Surgeon(s) and Role:  Panel 1:     * Anderson Sanchez MD - Resident - Assisting  Panel 2:     * Erum Bowling MD    Procedure Summary  Anesthesia: General  ASA: II  Anesthesia Staff: Anesthesiologist: Anatoliy Solorzano MD PhD  C-AA: GAYLE Leija  Anesthesia Resident: Samara Moses MD  Estimated Blood Loss: 125mL  Intra-op Medications:   Medication Name Total Dose   oxymetazoline (Afrin) 0.05 % nasal spray 3 spray   lidocaine-epinephrine (Xylocaine W/EPI) 1 %-1:100,000 injection 4 mL   thrombin solution 5,000 Units   sodium chloride 0.9 % irrigation solution 2,000 mL   HYDROmorphone (Dilaudid) injection 0.5 mg 1.5 mg   labetaloL (Normodyne,Trandate) injection 10 mg 5 mg   lactated Ringer's infusion 56.67 mL   oxyCODONE (Roxicodone) immediate release tablet 10 mg 10 mg              Anesthesia Record               Intraprocedure I/O Totals          Intake    LR 1900.00 mL    Propofol Drip 0.00 mL    The total shown is the total volume documented since Anesthesia Start was filed.    lactated Ringer's 313.75 mL    Total Intake 2213.75 mL       Output    Urine 280 mL    Est. Blood Loss 125 mL    Total Output 405 mL       Net    Net Volume 1808.75 mL          Specimen:   ID Type Source Tests Collected by Time   1 : Pituitary tumor Tissue PITUITARY SURGICAL  PATHOLOGY EXAM Anoop Lanza MD 11/17/2023 1252        Staff:   Circulator: Jin Rueda RN  Relief Circulator: Mirat Llanes RN  Scrub Person: Sergio Mata; Mirta Llanes RN         Drains and/or Catheters:   [REMOVED] Urethral Catheter Non-latex 16 Fr. (Removed)   Site Assessment Clean;Skin intact 11/18/23 0147   Collection Container Standard drainage bag 11/17/23 1600   Securement Method Securing device (Describe) 11/17/23 1600   Reason for Continuing Urinary Catheterization accurate hourly measurement of urine volume in a critically ill patient that cannot be assessed by other volumes and urine collection strategies 11/18/23 0900   Output (mL) 150 mL 11/18/23 1106         Findings:   1.  No CSF leak following tumor resection  2.  Tumor consistent with adenoma  3.  Reconstruction performed with free graft overlay, Surgicel, Floseal, NasoPore  4.  No nonabsorbable packing was placed    Indications: Shari Guerra is an 63 y.o. female who was found to have a pituitary region tumor incidentally on imaging for a different issue.  She was evaluated by neurosurgery who recommended resection.  Risks and benefits were reviewed and we now proceed to the operating room for surgical intervention.    Procedure Details:  After informed consent was obtained and all questions were answered the patient was brought to the operating room and placed supine on the operating room table.  General anesthesia was induced by the anesthesia staff and the patient was orally intubated.  The table was turned 90 degrees.  Afrin-soaked pledgets were placed within both the patient's nasal cavities.    The CT image guidance system was brought to the field.  The CT data was uploaded, reviewed, and a plan was finalized.  The headset was attached with the patient.  The image guidance was registered accurate in 3 separate orientations and was used throughout the surgical procedure to identify critical landmarks such  as the borders of the orbit, ethmoid skull base, position of the carotid arteries, and the optic nerves.  The patient was then prepped and draped in a standard fashion for endonasal surgery.    Free mucosal graft harvest was performed.  Each middle turbinate was identified and resected and each posterior blood supply was cauterized.  The mucosa was harvested from the left middle turbinate and was later used for reconstruction of the sellar defect.    Endoscopic endonasal transsphenoidal resection of pituitary region neoplasm was performed.  Bilaterally anterior and posterior ethmoid air cells adjacent to the lamina and along the ethmoid skull base were widely removed.  Each superior turbinate was identified and the inferior one third was resected.  The natural drainage pathway of the sphenoid was identified and cannulated and each sphenoidotomy was opened widely in all orientations.  A posterior septectomy was created and the rostrum and intersphenoid septation were resected.  In conjunction with neurological surgery the bone of the anterior aspect of the sella turcica was then then removed.  The dura was traversed and tumor was encountered consistent with pituitary adenoma.  This was resected with a combination of curette and suction.  We initially began inferiorly and then laterally and finally superiorly.  There was no evidence of CSF leakage following resection and the diaphragma relaxed well into the surgical defect.  No gross tumor was visualized at conclusion of the resection.  Intrasellar hemostasis was achieved with Floseal.    Free mucosal graft reconstruction was performed.  The previously harvested middle turbinate free graft was cut to size and placed in overlay fashion.  I removed the mucosa circumferentially around the dural defect to allow better adherence.  Surgicel was placed at the periphery followed by Floseal and then NasoPore.  This concluded the surgical procedure.    The patient was turned  over to the anesthesia staff and extubated in the operating without complication.  She will be admitted to the neurological surgery service for further care.    Complications:  None; patient tolerated the procedure well.    Disposition: PACU - hemodynamically stable.  Condition: stable     Attending Attestation: I was present and scrubbed for the key portions of the procedure.    Anoop Lanza  Phone Number: [phone]

## 2023-11-21 ENCOUNTER — OFFICE VISIT (OUTPATIENT)
Dept: OTOLARYNGOLOGY | Facility: CLINIC | Age: 63
End: 2023-11-21
Payer: MEDICARE

## 2023-11-21 VITALS — WEIGHT: 180 LBS | BODY MASS INDEX: 29.99 KG/M2 | HEIGHT: 65 IN | TEMPERATURE: 98 F

## 2023-11-21 DIAGNOSIS — J34.2 DEVIATED NASAL SEPTUM: ICD-10-CM

## 2023-11-21 DIAGNOSIS — J01.20 ACUTE NON-RECURRENT ETHMOIDAL SINUSITIS: ICD-10-CM

## 2023-11-21 DIAGNOSIS — J01.30 ACUTE NON-RECURRENT SPHENOIDAL SINUSITIS: Primary | ICD-10-CM

## 2023-11-21 PROCEDURE — 1159F MED LIST DOCD IN RCRD: CPT | Performed by: OTOLARYNGOLOGY

## 2023-11-21 PROCEDURE — 1160F RVW MEDS BY RX/DR IN RCRD: CPT | Performed by: OTOLARYNGOLOGY

## 2023-11-21 PROCEDURE — 1036F TOBACCO NON-USER: CPT | Performed by: OTOLARYNGOLOGY

## 2023-11-21 PROCEDURE — 31237 NSL/SINS NDSC SURG BX POLYPC: CPT | Performed by: OTOLARYNGOLOGY

## 2023-11-21 PROCEDURE — 1125F AMNT PAIN NOTED PAIN PRSNT: CPT | Performed by: OTOLARYNGOLOGY

## 2023-11-21 PROCEDURE — 99024 POSTOP FOLLOW-UP VISIT: CPT | Performed by: OTOLARYNGOLOGY

## 2023-11-21 PROCEDURE — 1111F DSCHRG MED/CURRENT MED MERGE: CPT | Performed by: OTOLARYNGOLOGY

## 2023-11-21 ASSESSMENT — PATIENT HEALTH QUESTIONNAIRE - PHQ9
1. LITTLE INTEREST OR PLEASURE IN DOING THINGS: NOT AT ALL
SUM OF ALL RESPONSES TO PHQ9 QUESTIONS 1 AND 2: 0
2. FEELING DOWN, DEPRESSED OR HOPELESS: NOT AT ALL

## 2023-11-21 NOTE — PROGRESS NOTES
Chief Complaint:  1. Pituitary tumor s/p endoscopic resection with free graft reconstruction 11/17/23  2. Rhinorrhea  3. Decreased sense of smell   4. Headaches, migraines  5. Deviated nasal septum to left     History Of Present Illness:  Reason for this visit:    Shari Guerra presents for her first postoperative evaluation.  In the interval time, she has done well.  She denies significant bleeding and her pain has been well controlled.  She is using saline spray regularly.    She has some irritation in her throat and wanted to have this evaluated today.    She takes 81 mg of aspirin and questions when she can resume this.    Active Problems:  Patient Active Problem List   Diagnosis    Pituitary adenoma (CMS/HCC)    Age-related osteoporosis without current pathological fracture    Elevated blood pressure reading    Fatigue    Headache    Hyperlipidemia    Hypokalemia    Migraine without aura and responsive to treatment    Nausea and vomiting    Retinal micro-aneurysm, right    Vitamin D deficiency    Decreased sense of smell    Deviated septum    Rhinorrhea    HTN (hypertension)    Macroadenoma        Past Medical History:  She has a past medical history of Fatty liver, GERD (gastroesophageal reflux disease), Hyperlipidemia, Osteoporosis, and Pituitary macroadenoma (CMS/HCC).    Surgical History:  She has a past surgical history that includes Hysterectomy.     Family History:  Family History   Problem Relation Name Age of Onset    Cirrhosis Mother      Heart attack Father      Stroke Father      Other (carotid artery stenosis) Father         Social History:  She reports that she has never smoked. She has never used smokeless tobacco. She reports current alcohol use of about 3.0 standard drinks of alcohol per week. She reports that she does not use drugs.     Allergies:  Patient has no known allergies.    Current Meds:  Current Outpatient Medications:     acetaminophen (Tylenol) 500 mg tablet, Take 2 tablets  (1,000 mg) by mouth every 6 hours if needed for mild pain (1 - 3) for up to 8 days., Disp: 30 tablet, Rfl: 0    [START ON 11/24/2023] aspirin 81 mg EC tablet, Take 1 tablet (81 mg) by mouth 3 (three) times a week. On Monday, Wednesday, and Friday Do not start before November 24, 2023., Disp: 30 tablet, Rfl: 0    atorvastatin (Lipitor) 20 mg tablet, Take 1 tablet (20 mg) by mouth once daily., Disp: , Rfl:     cholecalciferol (Vitamin D-3) 25 MCG (1000 UT) capsule, Take 1 capsule (25 mcg) by mouth once daily., Disp: , Rfl:     citalopram (CeleXA) 20 mg tablet, Take 1 tablet (20 mg) by mouth once daily., Disp: , Rfl:     docusate sodium (Colace) 100 mg capsule, Take 1 capsule (100 mg) by mouth 2 times a day for 8 days., Disp: 16 capsule, Rfl: 0    oxyCODONE (Roxicodone) 5 mg immediate release tablet, Take 1 tablet (5 mg) by mouth every 6 hours if needed for severe pain (7 - 10) for up to 8 days., Disp: 15 tablet, Rfl: 0    pantoprazole (ProtoNix) 40 mg EC tablet, Take 1 tablet (40 mg) by mouth once daily in the morning. Take before meals., Disp: , Rfl:     valACYclovir (Valtrex) 500 mg tablet, Take 1 tablet (500 mg) by mouth 2 times a day. As needed for cold sore breakouts, Disp: , Rfl:     Vitals:  Visit Vitals  LMP  (LMP Unknown)   OB Status Postmenopausal   Smoking Status Never        Physical Exam:  Nose: On external exam there are neither lesions nor asymmetry of the nasal tip/dorsum. On anterior rhinoscopy, visualization posteriorly is limited on anterior examination. For this reason, to adequately evaluate posteriorly for masses, source of epistaxis, polypoid disease, debridement, and/or signs of infections, nasal endoscopy is indicated.  (Please see procedure below.)    SINONASAL ENDOSCOPY WITH DEBRIDEMENT (CPT 20416-02-93):  Due to the patient's chronic sinusitis/chronic rhinitis, sinonasal endoscopy with debridement is indicated. After discussion of risks and benefits, and topical decongestion and anesthesia,  an endoscope was used to perform nasal endoscopy with debridement.  A timeout identifying the patient, the procedure, and any concerns was performed prior to beginning the procedure.    Findings:  Examination of the left nasal cavity revealed granulation tissue across between the deviated septum and the lateral wall.  This was completely removed with a combination of alligator, ear curette, and suction.  Additional debris and blood product was removed from the ethmoid cavity.  Examination of the right nasal cavity revealed a large crust within the posterior ethmoid cavity extending into the sphenoid.  This was removed and following this there was an arterial bleed from the septum where the crust was in continuity with it.  I placed several small pieces of cotton with Afrin over this area and left these in place for about 10 minutes.  Following placement of the Afrin, she became lightheaded and was reclined and recovered in about 10-15 minutes.  I then removed the Afrin-soaked gauze and there was no further bleeding.  Surgicel was placed over the area of bleeding in multiple layers and compressed in place and then soaked with Afrin.  I then removed further debris across the cut edge of the middle turbinate extending to the sphenoid face.  I watched her for an additional 15-20 minutes with no further bleeding.  Estimated blood loss was approximately 25 cc.    A flexible scope was advanced into her nasopharynx, hypopharynx, and larynx.  There were no specifically concerning findings.  She did have some irritation over the base of her tongue that I suspect is from the endotracheal tube.    Provider Impressions:  1. Pituitary tumor s/p endoscopic resection with free graft reconstruction 11/17/23  2. Rhinorrhea  3. Decreased sense of smell   4. Headaches, migraines  5. Deviated nasal septum to left     Discussion:  Shari Guerra and her  and I discussed her procedure and also the bleeding that was encountered  today during the debridement.  This was controlled with Afrin soaked pledgets and then Surgicel.  We discussed what to do for any further bleeding notably application of Afrin and pressure to the front part of her nose.  I recommended leaning forward and not backward while holding pressure.  I provided her with contact information for my office during the day as well as what to do if she has any issues at night.  Hemostasis was excellent when she was discharged from my clinic.    In regard to resuming aspirin, I recommended waiting about 3 weeks after her surgical date as she is not using this for a specific reason but rather for prophylaxis.    She questioned whether or not she could drive and if she is not taking narcotic pain medication and feeling well I think that she can drive later this week.  I recommended continued avoidance of strenuous activity.  I asked her to follow-up with me in 1 week.    Patient Discussion/Summary:  Please followup with me in 1 week for reevaluation or sooner with any questions or concerns. Please feel free to contact my office by calling [phone] with any questions.    Signature:  Scribe Attestation  By signing my name below, IChioma Scribe   attest that this documentation has been prepared under the direction and in the presence of Yosvany Barnard MD.

## 2023-11-23 LAB — ESTRADIOL LC/MS/MS: 4 PG/ML

## 2023-11-27 ENCOUNTER — OFFICE VISIT (OUTPATIENT)
Dept: OTOLARYNGOLOGY | Facility: CLINIC | Age: 63
End: 2023-11-27
Payer: MEDICARE

## 2023-11-27 VITALS — BODY MASS INDEX: 30.21 KG/M2 | TEMPERATURE: 98 F | HEIGHT: 65 IN | WEIGHT: 181.3 LBS

## 2023-11-27 DIAGNOSIS — J34.2 DEVIATED NASAL SEPTUM: ICD-10-CM

## 2023-11-27 DIAGNOSIS — J01.20 ACUTE NON-RECURRENT ETHMOIDAL SINUSITIS: ICD-10-CM

## 2023-11-27 DIAGNOSIS — J01.30 ACUTE NON-RECURRENT SPHENOIDAL SINUSITIS: Primary | ICD-10-CM

## 2023-11-27 PROCEDURE — 99024 POSTOP FOLLOW-UP VISIT: CPT | Performed by: OTOLARYNGOLOGY

## 2023-11-27 PROCEDURE — 1036F TOBACCO NON-USER: CPT | Performed by: OTOLARYNGOLOGY

## 2023-11-27 PROCEDURE — 31237 NSL/SINS NDSC SURG BX POLYPC: CPT | Performed by: OTOLARYNGOLOGY

## 2023-11-27 PROCEDURE — 1159F MED LIST DOCD IN RCRD: CPT | Performed by: OTOLARYNGOLOGY

## 2023-11-27 PROCEDURE — 1160F RVW MEDS BY RX/DR IN RCRD: CPT | Performed by: OTOLARYNGOLOGY

## 2023-11-27 PROCEDURE — 1125F AMNT PAIN NOTED PAIN PRSNT: CPT | Performed by: OTOLARYNGOLOGY

## 2023-11-27 PROCEDURE — 1111F DSCHRG MED/CURRENT MED MERGE: CPT | Performed by: OTOLARYNGOLOGY

## 2023-11-27 ASSESSMENT — PATIENT HEALTH QUESTIONNAIRE - PHQ9
SUM OF ALL RESPONSES TO PHQ9 QUESTIONS 1 & 2: 0
2. FEELING DOWN, DEPRESSED OR HOPELESS: NOT AT ALL
1. LITTLE INTEREST OR PLEASURE IN DOING THINGS: NOT AT ALL

## 2023-11-27 NOTE — PROGRESS NOTES
Chief Complaint:  1. Pituitary tumor s/p endoscopic resection with free graft reconstruction 11/17/23  2. Rhinorrhea  3. Decreased sense of smell   4. Headaches, migraines  5. Deviated nasal septum to left     History Of Present Illness:  Reason for this visit:    Shari Guerra presents for her second postoperative evaluation.    She has not had any bleeding since her last evaluation with me.  She is using salt water spray consistently.     Active Problems:  Patient Active Problem List   Diagnosis    Pituitary adenoma (CMS/HCC)    Age-related osteoporosis without current pathological fracture    Elevated blood pressure reading    Fatigue    Headache    Hyperlipidemia    Hypokalemia    Migraine without aura and responsive to treatment    Nausea and vomiting    Retinal micro-aneurysm, right    Vitamin D deficiency    Decreased sense of smell    Deviated septum    Rhinorrhea    HTN (hypertension)    Macroadenoma        Past Medical History:  She has a past medical history of Fatty liver, GERD (gastroesophageal reflux disease), Hyperlipidemia, Osteoporosis, and Pituitary macroadenoma (CMS/HCC).    Surgical History:  She has a past surgical history that includes Hysterectomy.     Family History:  Family History   Problem Relation Name Age of Onset    Cirrhosis Mother      Heart attack Father      Stroke Father      Other (carotid artery stenosis) Father         Social History:  She reports that she has never smoked. She has never used smokeless tobacco. She reports current alcohol use of about 3.0 standard drinks of alcohol per week. She reports that she does not use drugs.     Allergies:  Patient has no known allergies.    Current Meds:  Current Outpatient Medications:     acetaminophen (Tylenol) 500 mg tablet, Take 2 tablets (1,000 mg) by mouth every 6 hours if needed for mild pain (1 - 3) for up to 8 days., Disp: 30 tablet, Rfl: 0    aspirin 81 mg EC tablet, Take 1 tablet (81 mg) by mouth 3 (three) times a week. On  Monday, Wednesday, and Friday Do not start before November 24, 2023., Disp: 30 tablet, Rfl: 0    atorvastatin (Lipitor) 20 mg tablet, Take 1 tablet (20 mg) by mouth once daily., Disp: , Rfl:     cholecalciferol (Vitamin D-3) 25 MCG (1000 UT) capsule, Take 1 capsule (25 mcg) by mouth once daily., Disp: , Rfl:     citalopram (CeleXA) 20 mg tablet, Take 1 tablet (20 mg) by mouth once daily., Disp: , Rfl:     pantoprazole (ProtoNix) 40 mg EC tablet, Take 1 tablet (40 mg) by mouth once daily in the morning. Take before meals., Disp: , Rfl:     valACYclovir (Valtrex) 500 mg tablet, Take 1 tablet (500 mg) by mouth 2 times a day. As needed for cold sore breakouts, Disp: , Rfl:     Vitals:  Visit Vitals  LMP  (LMP Unknown)   OB Status Postmenopausal   Smoking Status Never        Physical Exam:  Nose: On external exam there are neither lesions nor asymmetry of the nasal tip/dorsum. On anterior rhinoscopy, visualization posteriorly is limited on anterior examination. For this reason, to adequately evaluate posteriorly for masses, source of epistaxis, polypoid disease, debridement, and/or signs of infections, nasal endoscopy is indicated.  (Please see procedure below.)    SINONASAL ENDOSCOPY WITH DEBRIDEMENT (CPT 20786-R-59):  Due to the patient's chronic sinusitis/chronic rhinitis, sinonasal endoscopy with debridement is indicated. After discussion of risks and benefits, and topical decongestion and anesthesia, an endoscope was used to perform nasal endoscopy with debridement.  A timeout identifying the patient, the procedure, and any concerns was performed prior to beginning the procedure.    Findings:  Examination of the left nasal cavity revealed no tethering between the lateral wall and the septum.  Some mucoid debris was evacuated from the posterior ethmoid cavity with a suction.  There was debris within the left sphenoid that was left in place as it was difficult to cannulate the sphenoid with a suction given her septal  deviation to that side.  Examination of the right nasal cavity revealed crusting in the region of the bleeding vessel that I encountered at the last clinic visit.  I was able to move lateral and posterior to this evacuating debris within the posterior ethmoid cavity extending at the sphenoid with a suction and alligator.  At the conclusion of the debridement the sphenoid was patent.  The crusting over the septum was left in place.  She tolerated the procedure well.  There was no purulence noted.    Provider Impressions:  1. Pituitary tumor s/p endoscopic resection with free graft reconstruction 11/17/23  2. Rhinorrhea  3. Decreased sense of smell   4. Headaches, migraines  5. Deviated nasal septum to left     Discussion:  Shari Guerra appeared well on examination today.  I think that she can blow her nose gently and we discussed some parameters around this.  In terms of activity she can resume full activity at 3 weeks from her surgical date.    I asked her to continue salt water spray regularly.  The remainder of debris within her sinuses should breakdown on its own over time.    I would like to see her back in about 6 to 8 weeks.  She was amenable to this and all questions were answered.      Patient Discussion/Summary:  Please followup with me in 2 months for reevaluation or sooner with any questions or concerns. Please feel free to contact my office by calling [phone] with any questions.    Signature:  Scribe Attestation  By signing my name below, IChioma Scribe   attest that this documentation has been prepared under the direction and in the presence of Yosvany Barnard MD.

## 2023-11-30 ENCOUNTER — OFFICE VISIT (OUTPATIENT)
Dept: NEUROSURGERY | Facility: CLINIC | Age: 63
End: 2023-11-30
Payer: MEDICARE

## 2023-11-30 VITALS
HEIGHT: 64 IN | DIASTOLIC BLOOD PRESSURE: 83 MMHG | TEMPERATURE: 97.2 F | SYSTOLIC BLOOD PRESSURE: 129 MMHG | HEART RATE: 91 BPM | WEIGHT: 177 LBS | RESPIRATION RATE: 16 BRPM | BODY MASS INDEX: 30.22 KG/M2

## 2023-11-30 DIAGNOSIS — D35.2 PITUITARY ADENOMA (MULTI): Primary | ICD-10-CM

## 2023-11-30 PROCEDURE — 99024 POSTOP FOLLOW-UP VISIT: CPT | Performed by: NURSE PRACTITIONER

## 2023-11-30 PROCEDURE — 1036F TOBACCO NON-USER: CPT | Performed by: NURSE PRACTITIONER

## 2023-11-30 PROCEDURE — 3079F DIAST BP 80-89 MM HG: CPT | Performed by: NURSE PRACTITIONER

## 2023-11-30 PROCEDURE — 1160F RVW MEDS BY RX/DR IN RCRD: CPT | Performed by: NURSE PRACTITIONER

## 2023-11-30 PROCEDURE — 3074F SYST BP LT 130 MM HG: CPT | Performed by: NURSE PRACTITIONER

## 2023-11-30 PROCEDURE — 1126F AMNT PAIN NOTED NONE PRSNT: CPT | Performed by: NURSE PRACTITIONER

## 2023-11-30 PROCEDURE — 1111F DSCHRG MED/CURRENT MED MERGE: CPT | Performed by: NURSE PRACTITIONER

## 2023-11-30 PROCEDURE — 1159F MED LIST DOCD IN RCRD: CPT | Performed by: NURSE PRACTITIONER

## 2023-11-30 ASSESSMENT — PAIN SCALES - GENERAL: PAINLEVEL: 0-NO PAIN
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
## 2023-12-04 LAB
LAB AP ASR DISCLAIMER: NORMAL
LABORATORY COMMENT REPORT: NORMAL
PATH REPORT.COMMENTS IMP SPEC: NORMAL
PATH REPORT.FINAL DX SPEC: NORMAL
PATH REPORT.GROSS SPEC: NORMAL
PATH REPORT.RELEVANT HX SPEC: NORMAL
PATH REPORT.TOTAL CANCER: NORMAL

## 2023-12-13 LAB
HOLD SPECIMEN: NORMAL
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
## 2023-12-19 ENCOUNTER — OFFICE VISIT (OUTPATIENT)
Dept: OTOLARYNGOLOGY | Facility: CLINIC | Age: 63
End: 2023-12-19
Payer: MEDICARE

## 2023-12-19 VITALS — BODY MASS INDEX: 31.07 KG/M2 | WEIGHT: 181 LBS

## 2023-12-19 DIAGNOSIS — J32.3 CHRONIC SPHENOIDAL SINUSITIS: ICD-10-CM

## 2023-12-19 DIAGNOSIS — J32.2 CHRONIC ETHMOIDAL SINUSITIS: ICD-10-CM

## 2023-12-19 DIAGNOSIS — D35.2 PITUITARY ADENOMA (MULTI): Primary | ICD-10-CM

## 2023-12-19 PROCEDURE — 31237 NSL/SINS NDSC SURG BX POLYPC: CPT | Performed by: OTOLARYNGOLOGY

## 2023-12-19 PROCEDURE — 1159F MED LIST DOCD IN RCRD: CPT | Performed by: OTOLARYNGOLOGY

## 2023-12-19 PROCEDURE — 1111F DSCHRG MED/CURRENT MED MERGE: CPT | Performed by: OTOLARYNGOLOGY

## 2023-12-19 PROCEDURE — 1126F AMNT PAIN NOTED NONE PRSNT: CPT | Performed by: OTOLARYNGOLOGY

## 2023-12-19 PROCEDURE — 99024 POSTOP FOLLOW-UP VISIT: CPT | Performed by: OTOLARYNGOLOGY

## 2023-12-19 PROCEDURE — 1036F TOBACCO NON-USER: CPT | Performed by: OTOLARYNGOLOGY

## 2023-12-19 PROCEDURE — 1160F RVW MEDS BY RX/DR IN RCRD: CPT | Performed by: OTOLARYNGOLOGY

## 2023-12-19 RX ORDER — IBANDRONATE SODIUM 150 MG/1
TABLET, FILM COATED ORAL
COMMUNITY
Start: 2023-12-08

## 2023-12-19 ASSESSMENT — PATIENT HEALTH QUESTIONNAIRE - PHQ9: 2. FEELING DOWN, DEPRESSED OR HOPELESS: NOT AT ALL

## 2023-12-19 NOTE — LETTER
December 20, 2023     Yosvany Barnard MD  [address]  [facility] [address]    Patient: Shari Guerra   YOB: 1960   Date of Visit: 12/19/2023       Dear Dr. Yosvany Barnard MD:    Thank you for referring Shari Guerra to me for evaluation. Below are my notes for this consultation.  If you have questions, please do not hesitate to call me. I look forward to following your patient along with you.       Sincerely,     Evaristo Rojas MD      CC: No Recipients  ______________________________________________________________________________________    Chief Complaint:    Foul odor s/p EEA for pituitary adenoma    Referring Provider: No ref. provider found    History of Present Illness:    Shari Guerra is a 63 y.o. female, presenting for evaluation of a foul odor and copious discharge she is experiencing after undergoing endoscopic endonasal resection of a pituitary adenoma.  She states that it has progressed in the last few weeks.  She is not having any significant bleeding but she is having a metallic taste in her mouth as well.  She is continuing with her irrigations but finds that nothing much is coming out at this time.  No significant headache, stiff neck or other concerning symptoms.  She has follow-up visits scheduled with Dr. Barnard on January 2 and the neurosurgery service on January 4.     Review of Systems:    Review of symptoms was negative except for those stated including Cardiopulmonary, Genitourinary, Gastrointestinal, Psychological, Sleep pattern, Endocrine, Eyes, Neurologic, Musculoskeletal, Skin, Hematologic/Lymphatic and Allergic/Immunologic.     Medical History:    I have reviewed the patient's updated past medical history, surgical history, family history, social history, as well as current medications and allergies as of 12/19/2023. Changes to these items have been updated and marked as reviewed in the electronic medical record.    Physical  Exam:    Vitals:  weight is 82.1 kg (181 lb).   General: Patient doing well overall and is in no apparent distress.  Psych: Pleasant affect, and answers questions appropriately.  Head & Face: Symmetric facial movements  Eyes: Pupils equal, round, reactive.  Extraocular movements intact without gaze restrictions or nystagmus. No epiphora.  Ears:  External auditory canals are normal.  Tympanic membranes are clear.  No middle ear effusion is seen.  All middle ear landmarks are normal.  Nose: Anterior rhinoscopy revealed normal sinonasal mucosa. More posterior areas of the nasal cavity could not be completely examined.  Oral Cavity/Oropharynx:  Without lesions or masses to visual exam.  Neck: Supple without lymphadenopathy.  Lungs: Non-labored, and without evidence of stridor.  Cardiac: Pulses are strong, well-perfused.  Extremities: Without gross evidence of clubbing, cyanosis, or edema.  Neuro: Cranial nerves II-XII grossly intact; Intact facial movements.    Procedure:  Bilateral sinonasal cavity debridement (89477)  Pre-procedure diagnosis: Sinonasal crusting/obstruction  Post-procedure diagnosis:  Sinonasal crusting/obstruction  Indication:  Remove nasal crusting, prevent synechiae    Verbal consent was obtained after informed discussion    Topical anesthetic was applied with a combination of 4% lidocaine spray and oxymetazoline. A 0 and 30-degree endoscope was used throughout the procedure.    Blood clots, debris, eschar and nasal crusting were removed from the middle meatus, maxillary sinus and area of the frontal recess bilaterally.  A combination of straight and curved suctions, as well as grasping forceps were utilized bilaterally.      Findings:  Copious crusting within the ethmoid cavity bilaterally and the sphenoid fossa. Exudative material contiguous with liquifying blood products was suctioned clear. No luke purulence.    The patient tolerated the procedure well.      Assessment:     Shari Guerra is  a 63 y.o. female with history of pituitary adenoma, s/p endoscopic endonasal resection, presenting with foul odor and post-nasal drainage/purulent rhinorrhea -- no evidence of infection but copious crusting was debrided today.    Plan:      Recommend continuing sinonasal irrigations-- I think she will start to improve rapidly with respect to foul odor and the other symptoms she is experiencing today now that we have cleared the remaining crusting from her sphenoid/ethmoid cavities.    Recommend she follow up as directed with Dr. Barnard in January or sooner if needed.       Evaristo Rojas MD, M.Eng.   of Otolaryngology - Head & Neck Surgery  Division of Rhinology and Endoscopic Skull Base Surgery  [facility]\

## 2023-12-20 NOTE — PROGRESS NOTES
Chief Complaint:    Foul odor s/p EEA for pituitary adenoma    Referring Provider: No ref. provider found    History of Present Illness:    Shari Guerra is a 63 y.o. female, presenting for evaluation of a foul odor and copious discharge she is experiencing after undergoing endoscopic endonasal resection of a pituitary adenoma.  She states that it has progressed in the last few weeks.  She is not having any significant bleeding but she is having a metallic taste in her mouth as well.  She is continuing with her irrigations but finds that nothing much is coming out at this time.  No significant headache, stiff neck or other concerning symptoms.  She has follow-up visits scheduled with Dr. Barnard on January 2 and the neurosurgery service on January 4.  ?  Review of Systems:    Review of symptoms was negative except for those stated including Cardiopulmonary, Genitourinary, Gastrointestinal, Psychological, Sleep pattern, Endocrine, Eyes, Neurologic, Musculoskeletal, Skin, Hematologic/Lymphatic and Allergic/Immunologic.     Medical History:    I have reviewed the patient's updated past medical history, surgical history, family history, social history, as well as current medications and allergies as of 12/19/2023. Changes to these items have been updated and marked as reviewed in the electronic medical record.    Physical Exam:    Vitals:  weight is 82.1 kg (181 lb).   General: Patient doing well overall and is in no apparent distress.  Psych: Pleasant affect, and answers questions appropriately.  Head & Face: Symmetric facial movements  Eyes: Pupils equal, round, reactive.  Extraocular movements intact without gaze restrictions or nystagmus. No epiphora.  Ears:  External auditory canals are normal.  Tympanic membranes are clear.  No middle ear effusion is seen.  All middle ear landmarks are normal.  Nose: Anterior rhinoscopy revealed normal sinonasal mucosa. More posterior areas of the nasal cavity could not be  completely examined.  Oral Cavity/Oropharynx:  Without lesions or masses to visual exam.  Neck: Supple without lymphadenopathy.  Lungs: Non-labored, and without evidence of stridor.  Cardiac: Pulses are strong, well-perfused.  Extremities: Without gross evidence of clubbing, cyanosis, or edema.  Neuro: Cranial nerves II-XII grossly intact; Intact facial movements.    Procedure:  Bilateral sinonasal cavity debridement (10568)  Pre-procedure diagnosis: Sinonasal crusting/obstruction  Post-procedure diagnosis:  Sinonasal crusting/obstruction  Indication:  Remove nasal crusting, prevent synechiae    Verbal consent was obtained after informed discussion    Topical anesthetic was applied with a combination of 4% lidocaine spray and oxymetazoline. A 0 and 30-degree endoscope was used throughout the procedure.    Blood clots, debris, eschar and nasal crusting were removed from the middle meatus, maxillary sinus and area of the frontal recess bilaterally.  A combination of straight and curved suctions, as well as grasping forceps were utilized bilaterally.      Findings:  Copious crusting within the ethmoid cavity bilaterally and the sphenoid fossa. Exudative material contiguous with liquifying blood products was suctioned clear. No luke purulence.    The patient tolerated the procedure well.      Assessment:     Shari Guerra is a 63 y.o. female with history of pituitary adenoma, s/p endoscopic endonasal resection, presenting with foul odor and post-nasal drainage/purulent rhinorrhea -- no evidence of infection but copious crusting was debrided today.    Plan:      Recommend continuing sinonasal irrigations-- I think she will start to improve rapidly with respect to foul odor and the other symptoms she is experiencing today now that we have cleared the remaining crusting from her sphenoid/ethmoid cavities.    Recommend she follow up as directed with Dr. Barnard in January or sooner if needed.       Evaristo Rojas,  Art HYMAN.   of Otolaryngology - Head & Neck Surgery  Division of Rhinology and Endoscopic Skull Base Surgery  [facility]\

## 2023-12-29 ENCOUNTER — OFFICE VISIT (OUTPATIENT)
Dept: ENDOCRINOLOGY | Facility: CLINIC | Age: 63
End: 2023-12-29
Payer: MEDICARE

## 2023-12-29 ENCOUNTER — LAB (OUTPATIENT)
Dept: LAB | Facility: LAB | Age: 63
End: 2023-12-29
Payer: MEDICARE

## 2023-12-29 ENCOUNTER — APPOINTMENT (OUTPATIENT)
Dept: ENDOCRINOLOGY | Facility: CLINIC | Age: 63
End: 2023-12-29
Payer: COMMERCIAL

## 2023-12-29 VITALS — WEIGHT: 182 LBS | DIASTOLIC BLOOD PRESSURE: 74 MMHG | BODY MASS INDEX: 31.24 KG/M2 | SYSTOLIC BLOOD PRESSURE: 122 MMHG

## 2023-12-29 DIAGNOSIS — I10 HYPERTENSION, UNSPECIFIED TYPE: ICD-10-CM

## 2023-12-29 DIAGNOSIS — D36.9 MACROADENOMA: ICD-10-CM

## 2023-12-29 DIAGNOSIS — D35.2 PITUITARY ADENOMA (MULTI): ICD-10-CM

## 2023-12-29 DIAGNOSIS — E78.5 HYPERLIPIDEMIA, UNSPECIFIED HYPERLIPIDEMIA TYPE: ICD-10-CM

## 2023-12-29 DIAGNOSIS — D35.2 PITUITARY ADENOMA (MULTI): Primary | ICD-10-CM

## 2023-12-29 LAB
ANION GAP SERPL CALC-SCNC: 14 MMOL/L (ref 10–20)
BUN SERPL-MCNC: 14 MG/DL (ref 6–23)
CALCIUM SERPL-MCNC: 10.1 MG/DL (ref 8.6–10.6)
CHLORIDE SERPL-SCNC: 107 MMOL/L (ref 98–107)
CO2 SERPL-SCNC: 26 MMOL/L (ref 21–32)
CORTIS SERPL-MCNC: 8.7 UG/DL (ref 2.5–20)
CREAT SERPL-MCNC: 0.81 MG/DL (ref 0.5–1.05)
FSH SERPL-ACNC: 44.5 IU/L
GFR SERPL CREATININE-BSD FRML MDRD: 81 ML/MIN/1.73M*2
GLUCOSE SERPL-MCNC: 102 MG/DL (ref 74–99)
LH SERPL-ACNC: 20.4 IU/L
POTASSIUM SERPL-SCNC: 3.9 MMOL/L (ref 3.5–5.3)
PROLACTIN SERPL-MCNC: 5 UG/L (ref 3–20)
SODIUM SERPL-SCNC: 143 MMOL/L (ref 136–145)
T4 FREE SERPL-MCNC: 1.12 NG/DL (ref 0.78–1.48)
TSH SERPL-ACNC: 2.71 MIU/L (ref 0.44–3.98)

## 2023-12-29 PROCEDURE — 3078F DIAST BP <80 MM HG: CPT | Performed by: INTERNAL MEDICINE

## 2023-12-29 PROCEDURE — 3074F SYST BP LT 130 MM HG: CPT | Performed by: INTERNAL MEDICINE

## 2023-12-29 PROCEDURE — 84439 ASSAY OF FREE THYROXINE: CPT

## 2023-12-29 PROCEDURE — 80048 BASIC METABOLIC PNL TOTAL CA: CPT

## 2023-12-29 PROCEDURE — 1160F RVW MEDS BY RX/DR IN RCRD: CPT | Performed by: INTERNAL MEDICINE

## 2023-12-29 PROCEDURE — 99214 OFFICE O/P EST MOD 30 MIN: CPT | Performed by: INTERNAL MEDICINE

## 2023-12-29 PROCEDURE — 83002 ASSAY OF GONADOTROPIN (LH): CPT

## 2023-12-29 PROCEDURE — 82533 TOTAL CORTISOL: CPT

## 2023-12-29 PROCEDURE — 82024 ASSAY OF ACTH: CPT

## 2023-12-29 PROCEDURE — 83001 ASSAY OF GONADOTROPIN (FSH): CPT

## 2023-12-29 PROCEDURE — 84443 ASSAY THYROID STIM HORMONE: CPT

## 2023-12-29 PROCEDURE — 1036F TOBACCO NON-USER: CPT | Performed by: INTERNAL MEDICINE

## 2023-12-29 PROCEDURE — 36415 COLL VENOUS BLD VENIPUNCTURE: CPT

## 2023-12-29 PROCEDURE — 1126F AMNT PAIN NOTED NONE PRSNT: CPT | Performed by: INTERNAL MEDICINE

## 2023-12-29 PROCEDURE — 1159F MED LIST DOCD IN RCRD: CPT | Performed by: INTERNAL MEDICINE

## 2023-12-29 PROCEDURE — 84146 ASSAY OF PROLACTIN: CPT

## 2023-12-29 NOTE — PROGRESS NOTES
patient ID: Shari Guerra is a 63 y.o. female who presents for Follow-up.  HPI  The patient comes in for follow up.    She had a pituitary macroadenoma post surgery November 17 pathology was consistent with a multilineage neuroendocrine tumor that was felt to be biologically nonactive.    She was assessed pre and post surgery and was eupituitary.    She has recovered with no difficulties.    She has had some nausea and fatigue.    She has had no change in her medical regimen.    She is looking to go on ZeTraxer through her primary care doctor.    Physically she has no complaints.        ROS  Comprehensive review of systems is negative.    Objective   Physical Exam  Visit Vitals  /74      Vitals:    12/29/23 1447   Weight: 82.6 kg (182 lb)      Body mass index is 31.24 kg/m².      Eyes normal  ENT normal. No adenopathy  Thyroid palpable and normal. No nodules  Chest clear to auscultation  Heart sounds are normal  Abdomen nontender. Bowel sounds normal. No organomegaly  Feet are okay    Assessment/Plan     1.  Pituitary macroadenoma  2.  Osteoporosis  3.  Hyperlipidemia  4.  Fatigue    Will check BMP FSH LH prolactin TSH free T4 cortisol and ACTH.    We discussed the pathology.    We discussed her weight and the medication she has been prescribed.    She will follow-up with neurosurgery with regards to pituitary imaging.    She will follow-up with me in 6 months sooner as needed.

## 2023-12-31 LAB — ACTH PLAS-MCNC: 20 PG/ML (ref 7.2–63.3)

## 2024-01-02 ENCOUNTER — OFFICE VISIT (OUTPATIENT)
Dept: OTOLARYNGOLOGY | Facility: CLINIC | Age: 64
End: 2024-01-02
Payer: MEDICARE

## 2024-01-02 VITALS — BODY MASS INDEX: 30.81 KG/M2 | WEIGHT: 180.5 LBS | HEIGHT: 64 IN | TEMPERATURE: 97.5 F

## 2024-01-02 DIAGNOSIS — R09.82 POST-NASAL DRAINAGE: ICD-10-CM

## 2024-01-02 DIAGNOSIS — R43.8 DECREASED SENSE OF SMELL: Primary | ICD-10-CM

## 2024-01-02 DIAGNOSIS — J01.20 SUBACUTE ETHMOIDAL SINUSITIS: ICD-10-CM

## 2024-01-02 DIAGNOSIS — J01.30 SUBACUTE SPHENOIDAL SINUSITIS: ICD-10-CM

## 2024-01-02 PROCEDURE — 31237 NSL/SINS NDSC SURG BX POLYPC: CPT | Performed by: OTOLARYNGOLOGY

## 2024-01-02 PROCEDURE — 1126F AMNT PAIN NOTED NONE PRSNT: CPT | Performed by: OTOLARYNGOLOGY

## 2024-01-02 PROCEDURE — 99024 POSTOP FOLLOW-UP VISIT: CPT | Performed by: OTOLARYNGOLOGY

## 2024-01-02 PROCEDURE — 1159F MED LIST DOCD IN RCRD: CPT | Performed by: OTOLARYNGOLOGY

## 2024-01-02 PROCEDURE — 1036F TOBACCO NON-USER: CPT | Performed by: OTOLARYNGOLOGY

## 2024-01-02 PROCEDURE — 1160F RVW MEDS BY RX/DR IN RCRD: CPT | Performed by: OTOLARYNGOLOGY

## 2024-01-02 ASSESSMENT — PATIENT HEALTH QUESTIONNAIRE - PHQ9
1. LITTLE INTEREST OR PLEASURE IN DOING THINGS: NOT AT ALL
2. FEELING DOWN, DEPRESSED OR HOPELESS: NOT AT ALL
SUM OF ALL RESPONSES TO PHQ9 QUESTIONS 1 AND 2: 0

## 2024-01-02 NOTE — PROGRESS NOTES
Chief Complaint:  1. Pituitary tumor s/p endoscopic resection with free graft reconstruction 11/17/23  2. Rhinorrhea  3. Decreased sense of smell   4. Headaches, migraines  5. Deviated nasal septum to left     History Of Present Illness:    Main Symptoms:  Patient does not have anterior nasal drainage.     Patient has  posterior nasal drainage that is improved.    Patient does not have nasal airway obstruction.   Patient does not have  facial pain.    Patient does not have  facial pressure.    Patient has decreased sense of smell that is improving. Decreased 25% of normal.   Associated Symptoms:   Patient does not have recent episodes of nasal bleeding.     Medications currently on for sinonasal symptoms:  saline spray    Shari KAMRON Guerra presents since last being seen by me 11/27/23 and Dr. Rojas 12/19/23.  She saw my partner because she developed a foul odor in her nose and he debrided crusting within her sinuses.  Her symptoms improved following this debridement.    Active Problems:  Patient Active Problem List   Diagnosis    Pituitary adenoma (CMS/HCC)    Age-related osteoporosis without current pathological fracture    Elevated blood pressure reading    Fatigue    Headache    Hyperlipidemia    Hypokalemia    Migraine without aura and responsive to treatment    Nausea and vomiting    Retinal micro-aneurysm, right    Vitamin D deficiency    Decreased sense of smell    Deviated septum    Rhinorrhea    HTN (hypertension)    Macroadenoma        Past Medical History:  She has a past medical history of Fatty liver, GERD (gastroesophageal reflux disease), Hyperlipidemia, Osteoporosis, and Pituitary macroadenoma (CMS/HCC).    Surgical History:  She has a past surgical history that includes Hysterectomy.     Family History:  Family History   Problem Relation Name Age of Onset    Cirrhosis Mother      Heart attack Father      Stroke Father      Other (carotid artery stenosis) Father         Social History:  She  reports that she has never smoked. She has never used smokeless tobacco. She reports current alcohol use of about 3.0 standard drinks of alcohol per week. She reports that she does not use drugs.     Allergies:  Patient has no known allergies.    Current Meds:    Current Outpatient Medications:     aspirin 81 mg EC tablet, Take 1 tablet (81 mg) by mouth 3 (three) times a week. On Monday, Wednesday, and Friday Do not start before November 24, 2023., Disp: 30 tablet, Rfl: 0    atorvastatin (Lipitor) 20 mg tablet, Take 1 tablet (20 mg) by mouth once daily., Disp: , Rfl:     cholecalciferol (Vitamin D-3) 25 MCG (1000 UT) capsule, Take 1 capsule (25 mcg) by mouth once daily., Disp: , Rfl:     citalopram (CeleXA) 20 mg tablet, Take 1 tablet (20 mg) by mouth once daily., Disp: , Rfl:     ibandronate (Boniva) 150 mg tablet, , Disp: , Rfl:     pantoprazole (ProtoNix) 40 mg EC tablet, Take 1 tablet (40 mg) by mouth once daily in the morning. Take before meals., Disp: , Rfl:     valACYclovir (Valtrex) 500 mg tablet, Take 1 tablet (500 mg) by mouth 2 times a day. As needed for cold sore breakouts, Disp: , Rfl:     Vitals:  Visit Vitals  LMP  (LMP Unknown)   OB Status Postmenopausal   Smoking Status Never        Physical Exam:  Nose: On external exam there are neither lesions nor asymmetry of the nasal tip/dorsum. On anterior rhinoscopy, visualization posteriorly is limited on anterior examination. For this reason, to adequately evaluate posteriorly for masses, source of epistaxis, polypoid disease, debridement, and/or signs of infections, nasal endoscopy is indicated.  (Please see procedure below.)    SINONASAL ENDOSCOPY WITH DEBRIDEMENT (CPT 56207-26-46):  Due to the patient's chronic sinusitis/chronic rhinitis/skull base condition, sinonasal endoscopy with debridement is indicated.After discussion of risks and benefits, and topical decongestion and anesthesia, an endoscope was used to perform nasal endoscopy with debridement.   A timeout identifying the patient, the procedure, and any concerns was performed prior to beginning the procedure.    Findings:  Examination of the right nasal cavity revealed scattered crusting and debris within the ethmoid cavity extending into the sphenoid that was removed with combination of alligator and suction.  The ethmoid and sphenoid were patent.  Examination of the left nasal cavity revealed more limited crusting in the sphenoid itself that was removed with a combination of alligator and suction.  There was reactive edema within each posterior ethmoid cavity extending into each sphenoid.  There was no purulence noted but there was mucoid debris posterior to the crusting that was evacuated with the suction.    Provider Impressions:  1. Pituitary tumor s/p endoscopic resection with free graft reconstruction 11/17/23  2. Postnasal drainage  3. Decreased sense of smell   4. Headaches, migraines  5. Deviated nasal septum to left     Discussion: Shari Guerra and I discussed her exam and symptoms.  I suggested utilization of saline rinses to help facilitate continued clearance of crusting.  I asked her to discontinue saline spray when she initiates the rinses as I think the rinses would be more beneficial.    If she continues to get recurrence of a foul odor in her nose we could consider oral antibiotic therapy.    There are no longer any restrictions on her activity.  She also mentioned that she is going to begin a weight loss injection and I have no concerns related to this in regard to healing from her procedure.  I asked her to follow-up with me in about 2 months.  She was amenable to this and all questions were answered.    Patient Discussion/Summary:  Please followup with me in 2 months for reevaluation or sooner with any questions or concerns. Please feel free to contact my office by calling [phone] with any questions.    Signature:  Scribe Attestation  By signing my name below, I, Chioma Ramírez  , Scribe   attest that this documentation has been prepared under the direction and in the presence of Yosvany Barnard MD.

## 2024-01-04 ENCOUNTER — OFFICE VISIT (OUTPATIENT)
Dept: NEUROSURGERY | Facility: CLINIC | Age: 64
End: 2024-01-04
Payer: MEDICARE

## 2024-01-04 VITALS
HEART RATE: 88 BPM | WEIGHT: 177 LBS | DIASTOLIC BLOOD PRESSURE: 82 MMHG | TEMPERATURE: 97 F | RESPIRATION RATE: 16 BRPM | HEIGHT: 64 IN | SYSTOLIC BLOOD PRESSURE: 133 MMHG | BODY MASS INDEX: 30.22 KG/M2

## 2024-01-04 DIAGNOSIS — D35.2 PITUITARY ADENOMA (MULTI): Primary | ICD-10-CM

## 2024-01-04 PROCEDURE — 3079F DIAST BP 80-89 MM HG: CPT | Performed by: NURSE PRACTITIONER

## 2024-01-04 PROCEDURE — 1159F MED LIST DOCD IN RCRD: CPT | Performed by: NURSE PRACTITIONER

## 2024-01-04 PROCEDURE — 99024 POSTOP FOLLOW-UP VISIT: CPT | Performed by: NURSE PRACTITIONER

## 2024-01-04 PROCEDURE — 1036F TOBACCO NON-USER: CPT | Performed by: NURSE PRACTITIONER

## 2024-01-04 PROCEDURE — 3075F SYST BP GE 130 - 139MM HG: CPT | Performed by: NURSE PRACTITIONER

## 2024-01-04 PROCEDURE — 1160F RVW MEDS BY RX/DR IN RCRD: CPT | Performed by: NURSE PRACTITIONER

## 2024-01-04 PROCEDURE — 1126F AMNT PAIN NOTED NONE PRSNT: CPT | Performed by: NURSE PRACTITIONER

## 2024-01-04 ASSESSMENT — PAIN SCALES - GENERAL: PAINLEVEL: 0-NO PAIN

## 2024-01-04 NOTE — PROGRESS NOTES
Chief Complaint:   2 week POV      History Of Present Illness:    Shari Guerra is a 62-year-old female with a PMH significant for HLD, GERD, WILLIAMSON, and migraine headaches who was incidentally found to have a 0.8 cm sellar mass during workup of migraine headaches in 2018.  Patient was told that it was a benign finding at that time and did not follow-up.  Patient had a recent TBI/accident and underwent subsequent imaging that noted a sellar mass now measuring 11 x 9 mm consistent with adenoma.  Patient is now s/p endonasal transphenoidal approach for reseciton of sellar mass on 11/17/23 with Dr. Lanza and Dr. Barnard.  Postoperative course was uncomplicated and patient's was able to be discharged home on 11/19/2023.  She presents to clinic for 6 week POV.     Denies any headaches. Fatigue is improving and she is back to her normal activities including exercising with weight lifting.         Vital Signs   Vitals:    01/04/24 1326   BP: 133/82   Pulse: 88   Resp: 16   Temp: 36.1 °C (97 °F)             Physical Exam:  A&Ox3   Fluent speech   EOMI; FC x 4  BRYANT: strength 5/5; no drift   Face and shoulder shrug symmetrical   Gait normal         Past Medical History:   has a past medical history of Fatty liver, GERD (gastroesophageal reflux disease), Hyperlipidemia, Osteoporosis, and Pituitary macroadenoma (CMS/HCC).    Past Surgical History:    has a past surgical history that includes Hysterectomy.    Outpatient Medications:  Current Outpatient Medications   Medication Instructions    aspirin 81 mg, oral, 3 times weekly, On Monday, Wednesday, and Friday    atorvastatin (Lipitor) 20 mg tablet Take 1 tablet (20 mg) by mouth once daily.    cholecalciferol (VITAMIN D-3) 25 mcg, oral, Daily    citalopram (CeleXA) 20 mg tablet Take 1 tablet (20 mg) by mouth once daily.    pantoprazole (ProtoNix) 40 mg EC tablet Take 1 tablet (40 mg) by mouth once daily in the morning. Take before meals.    valACYclovir (Valtrex) 500 mg  tablet Take 1 tablet (500 mg) by mouth 2 times a day. As needed for cold sore breakouts         Relevant Results:  No new imaging for review     Assessment/Plan   The encounter diagnosis was Pituitary adenoma (CMS/HCC).  Patient is a 63-year-old female who was incidentally found to have a 0.8 cm sellar mass during workup of migraine headaches in 2018.  Patient was told that it was a benign finding at that time and did not follow-up.  Patient had a recent TBI/accident and underwent subsequent imaging that noted a sellar mass now measuring 11 x 9 mm consistent with adenoma.  Patient is now s/p endonasal transphenoidal approach for reseciton of sellar mass on 11/17/23 with Dr. Lanza and Dr. Barnard. 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 She is following with endocrinology and optometry for VF testing. MRI Sella w/wo was ordered to be completed in February. After imaging obtained will have case presented at [facility]. Plan discussed with patient and  who were in agreement. All questions answered.

## 2024-01-08 ENCOUNTER — APPOINTMENT (OUTPATIENT)
Dept: NEUROSURGERY | Facility: HOSPITAL | Age: 64
End: 2024-01-08
Payer: MEDICARE

## 2024-03-05 ENCOUNTER — HOSPITAL ENCOUNTER (OUTPATIENT)
Dept: RADIOLOGY | Facility: CLINIC | Age: 64
Discharge: HOME | End: 2024-03-05
Payer: MEDICARE

## 2024-03-05 PROCEDURE — 70553 MRI BRAIN STEM W/O & W/DYE: CPT | Performed by: STUDENT IN AN ORGANIZED HEALTH CARE EDUCATION/TRAINING PROGRAM

## 2024-03-05 PROCEDURE — A9575 INJ GADOTERATE MEGLUMI 0.1ML: HCPCS | Performed by: NURSE PRACTITIONER

## 2024-03-05 PROCEDURE — 70553 MRI BRAIN STEM W/O & W/DYE: CPT

## 2024-03-05 PROCEDURE — 2500000004 HC RX 250 GENERAL PHARMACY W/ HCPCS (ALT 636 FOR OP/ED): Performed by: NURSE PRACTITIONER

## 2024-03-05 RX ORDER — GADOTERATE MEGLUMINE 376.9 MG/ML
15 INJECTION INTRAVENOUS
Status: COMPLETED | OUTPATIENT
Start: 2024-03-05 | End: 2024-03-05

## 2024-03-05 RX ADMIN — GADOTERATE MEGLUMINE 15 ML: 376.9 INJECTION INTRAVENOUS at 09:23

## 2024-03-11 ENCOUNTER — OFFICE VISIT (OUTPATIENT)
Dept: OTOLARYNGOLOGY | Facility: CLINIC | Age: 64
End: 2024-03-11
Payer: MEDICARE

## 2024-03-11 VITALS — HEIGHT: 64 IN | BODY MASS INDEX: 26.46 KG/M2 | WEIGHT: 155 LBS

## 2024-03-11 DIAGNOSIS — J34.89 RHINORRHEA: ICD-10-CM

## 2024-03-11 DIAGNOSIS — R43.8 DECREASED SENSE OF SMELL: Primary | ICD-10-CM

## 2024-03-11 DIAGNOSIS — J32.3 CHRONIC SPHENOIDAL SINUSITIS: ICD-10-CM

## 2024-03-11 PROCEDURE — 31231 NASAL ENDOSCOPY DX: CPT | Performed by: OTOLARYNGOLOGY

## 2024-03-11 PROCEDURE — 1159F MED LIST DOCD IN RCRD: CPT | Performed by: OTOLARYNGOLOGY

## 2024-03-11 PROCEDURE — 1126F AMNT PAIN NOTED NONE PRSNT: CPT | Performed by: OTOLARYNGOLOGY

## 2024-03-11 PROCEDURE — 1160F RVW MEDS BY RX/DR IN RCRD: CPT | Performed by: OTOLARYNGOLOGY

## 2024-03-11 PROCEDURE — 1036F TOBACCO NON-USER: CPT | Performed by: OTOLARYNGOLOGY

## 2024-03-11 PROCEDURE — 99213 OFFICE O/P EST LOW 20 MIN: CPT | Performed by: OTOLARYNGOLOGY

## 2024-03-11 PROCEDURE — 1157F ADVNC CARE PLAN IN RCRD: CPT | Performed by: OTOLARYNGOLOGY

## 2024-03-11 RX ORDER — TIRZEPATIDE 5 MG/.5ML
5 INJECTION, SOLUTION SUBCUTANEOUS
COMMUNITY

## 2024-03-11 NOTE — PROGRESS NOTES
Chief Complaint:  1. Pituitary tumor s/p endoscopic resection with free graft reconstruction 11/17/23  2. Postnasal drainage  3. Decreased sense of smell   4. Headaches, migraines  5. Deviated nasal septum to left     History Of Present Illness:    Main Symptoms:  Patient has occasional anterior nasal drainage that is clear in color and mild.   Patient has constant posterior nasal drainage that is clear in color.   Patient does not have nasal airway obstruction.   Patient does not have  facial pain.    Patient does not have  facial pressure.    Patient has decreased sense of smell that is improved. Decreased 65 % of normal.   Associated Symptoms:   Patient does not have  headaches.    Patient does not have throat clearing.    Patient does not have coughing.    Patient does not have dysphonia.   Patient does not have sneezing.   Patient does not have itchy eyes.   Patient does not have nasal bleeding.     Medications currently on for sinonasal symptoms:   None  Medications tried in the past for sinonasal symptoms:  Saline rinses Qday (stopped 2 weeks ago due to minimal benefit)    Shari Guerra presents since last being seen 1/2/24.     Her sense of smell continues to slowly improve.  She was utilizing saline rinses once per day until about 2 weeks ago when she stopped because she was no longer producing debris.  She mentioned an intermittent bad odor in her nose and taste that is not associated with any specific time of day, eating, or brushing her teeth.  She does take Protonix for reflux.     Active Problems:  Patient Active Problem List   Diagnosis    Pituitary adenoma (CMS/HCC)    Age-related osteoporosis without current pathological fracture    Elevated blood pressure reading    Fatigue    Headache    Hyperlipidemia    Hypokalemia    Migraine without aura and responsive to treatment    Nausea and vomiting    Retinal micro-aneurysm, right    Vitamin D deficiency    Decreased sense of smell    Deviated septum  "   Rhinorrhea    HTN (hypertension)    Macroadenoma        Past Medical History:  She has a past medical history of Fatty liver, GERD (gastroesophageal reflux disease), Hyperlipidemia, Osteoporosis, and Pituitary macroadenoma (CMS/HCC).    Surgical History:  She has a past surgical history that includes Hysterectomy.     Family History:  Family History   Problem Relation Name Age of Onset    Cirrhosis Mother      Heart attack Father      Stroke Father      Other (carotid artery stenosis) Father         Social History:  She reports that she has never smoked. She has never used smokeless tobacco. She reports current alcohol use of about 3.0 standard drinks of alcohol per week. She reports that she does not use drugs.     Allergies:  Patient has no known allergies.    Current Meds:    Current Outpatient Medications:     aspirin 81 mg EC tablet, Take 1 tablet (81 mg) by mouth 3 (three) times a week. On Monday, Wednesday, and Friday Do not start before November 24, 2023., Disp: 30 tablet, Rfl: 0    atorvastatin (Lipitor) 20 mg tablet, Take 1 tablet (20 mg) by mouth once daily., Disp: , Rfl:     cholecalciferol (Vitamin D-3) 25 MCG (1000 UT) capsule, Take 1 capsule (25 mcg) by mouth once daily., Disp: , Rfl:     citalopram (CeleXA) 20 mg tablet, Take 1 tablet (20 mg) by mouth once daily., Disp: , Rfl:     ibandronate (Boniva) 150 mg tablet, , Disp: , Rfl:     pantoprazole (ProtoNix) 40 mg EC tablet, Take 1 tablet (40 mg) by mouth once daily in the morning. Take before meals., Disp: , Rfl:     valACYclovir (Valtrex) 500 mg tablet, Take 1 tablet (500 mg) by mouth 2 times a day. As needed for cold sore breakouts, Disp: , Rfl:     Zepbound 5 mg/0.5 mL injection, Inject 5 mg under the skin every 7 days., Disp: , Rfl:     Vitals:  Visit Vitals  Ht 1.626 m (5' 4\")   Wt 70.3 kg (155 lb)   LMP  (LMP Unknown)   BMI 26.61 kg/m²   OB Status Postmenopausal   Smoking Status Never   BSA 1.78 m²      Physical Exam:  Nose: On external " exam there are neither lesions nor asymmetry of the nasal tip/dorsum. On anterior rhinoscopy, visualization posteriorly is limited on anterior examination. For this reason, to adequately evaluate posteriorly for masses, source of epistaxis, polypoid disease, debridement, and/or signs of infections, nasal endoscopy is indicated.  (Please see procedure below.)    SINONASAL ENDOSCOPY (CPT 03810): To better evaluate the patient's symptoms, sinonasal endoscopy is indicated.  After discussion of risks and benefits, and topical decongestion and anesthesia,an endoscope was used to perform nasal endoscopy on each side.  A time out identifying the patient, the procedure, the location of the procedure and any concerns was performed prior to beginning the procedure.    Findings:  Examination of each nasal cavity revealed a widely patent ethmoid cavity and sphenoid without crusting.  Each middle meatus and sphenoethmoid recess was normal without pus or polyps.  Her free graft was well-healed over the anterior aspect of the sella turcica.    Results/Data:  I personally reviewed the MR sella w and wo IV contrast dated 3/05/2024 with the patient today in clinic. It demonstrated   FINDINGS:  New changes of transsphenoidal mass resection. There is an  approximately 4 x 4 mm focus of T2 hyperintense signal and  enhancement at the site of the previously noted hypoenhancing mass in  the right sella (coronal image 10). There is improved leftward  deviation of the pituitary stalk. The pituitary gland otherwise  enhances homogenously. The optic chiasm is unremarkable.  Bilateral  cavernous sinuses demonstrate symmetric enhancement. Visualized  internal carotid arteries demonstrate expected flow voids.  Partially visualized T2 hyperintense signal in the white matter is  similar to previous and nonspecific. No abnormal parenchymal  enhancement. Ventricular size is within normal limits. No midline  shift or herniation. The basal cisterns  patent.  IMPRESSION:  Changes of transsphenoidal mass resection with associated nonspecific  enhancement in the right sella, this can serve as a new baseline for  future follow-up studies.    Provider Impressions:  1. 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 Postnasal drainage  3. Decreased sense of smell   4. Headaches, migraines  5. Deviated nasal septum to left     Discussion:  Shari Guerra and I discussed her exam and recent MRI.  In regard to the intermittent odor I suggested rinsing for the next couple weeks once or twice per day to see if this improves.  If it continues, I think she would do well with a trial of mometasone within rinses and I asked her to contact my office if she wishes to initiate this.  I think 1 mg dosing would be appropriate at the beginning.    I will email my neurosurgery CNP partner to flag her that the MRI has been done and that she would like to discuss this with their team.    I asked her to follow-up with me in 3 months.  She was amenable to this and all questions were answered.    Signature:  Scribe Attestation  By signing my name below, I, Rachid Gan   attest that this documentation has been prepared under the direction and in the presence of Yosvany Barnard MD.

## 2024-03-14 ENCOUNTER — TELEMEDICINE (OUTPATIENT)
Dept: NEUROSURGERY | Facility: CLINIC | Age: 64
End: 2024-03-14
Payer: MEDICARE

## 2024-03-14 DIAGNOSIS — D35.2 PITUITARY ADENOMA (MULTI): Primary | ICD-10-CM

## 2024-03-14 PROCEDURE — 1036F TOBACCO NON-USER: CPT | Performed by: NEUROLOGICAL SURGERY

## 2024-03-14 PROCEDURE — 1160F RVW MEDS BY RX/DR IN RCRD: CPT | Performed by: NEUROLOGICAL SURGERY

## 2024-03-14 PROCEDURE — 1159F MED LIST DOCD IN RCRD: CPT | Performed by: NEUROLOGICAL SURGERY

## 2024-03-14 PROCEDURE — 99024 POSTOP FOLLOW-UP VISIT: CPT | Performed by: NEUROLOGICAL SURGERY

## 2024-03-14 PROCEDURE — 1157F ADVNC CARE PLAN IN RCRD: CPT | Performed by: NEUROLOGICAL SURGERY

## 2024-03-14 NOTE — PROGRESS NOTES
[facility]  Neurosurgery    History of Present Illness      Shari Guerra is a 62-year-old female with a PMH significant for HLD, GERD, WILLIAMSON, and migraine headaches who was incidentally found to have a 0.8 cm sellar mass during workup of migraine headaches in 2018. Patient was told that it was a benign finding at that time and did not follow-up. Patient had a recent TBI/accident and underwent subsequent imaging that noted a sellar mass now measuring 11 x 9 mm consistent with adenoma. Patient is now s/p endonasal transphenoidal approach for resection of sellar mass on 11/17/23. 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 3 month post op MRI was completed and patient presents for virtual visit to discuss results.       Doing well            Objective      Vitals:   LMP  (LMP Unknown)         Physical Exam:      Speech fluent      Relevant Results:    I rviewed MRI post sella with resection of mass in pituitary area        Assessment & Plan      Diagnosis:  Shari was seen today for follow-up.  Diagnoses and all orders for this visit:  Pituitary adenoma (CMS/HCC)        Provider Impressions:    Doing well s/p resection of pituitary mass  Follow up endocrine    Follow up MRI in 6 months sellar with contrsast        Medical History     Past Medical History:   Diagnosis Date    Fatty liver     GERD (gastroesophageal reflux disease)     Hyperlipidemia     Osteoporosis     Pituitary macroadenoma (CMS/HCC)     scheduled for surgery 11/17/2023     Past Surgical History:   Procedure Laterality Date    HYSTERECTOMY       Social History     Tobacco Use    Smoking status: Never    Smokeless tobacco: Never   Vaping Use    Vaping Use: Never used   Substance Use Topics    Alcohol use: Yes     Alcohol/week: 3.0 standard drinks of alcohol     Types: 2 Cans of beer, 1 Shots of liquor per week    Drug use: Never     Family History   Problem Relation Name Age of Onset     Cirrhosis Mother      Heart attack Father      Stroke Father      Other (carotid artery stenosis) Father       No Known Allergies  Current Outpatient Medications   Medication Instructions    aspirin 81 mg, oral, 3 times weekly, On Monday, Wednesday, and Friday    atorvastatin (Lipitor) 20 mg tablet Take 1 tablet (20 mg) by mouth once daily.    cholecalciferol (VITAMIN D-3) 25 mcg, oral, Daily    citalopram (CeleXA) 20 mg tablet Take 1 tablet (20 mg) by mouth once daily.    ibandronate (Boniva) 150 mg tablet     pantoprazole (ProtoNix) 40 mg EC tablet Take 1 tablet (40 mg) by mouth once daily in the morning. Take before meals.    valACYclovir (Valtrex) 500 mg tablet Take 1 tablet (500 mg) by mouth 2 times a day. As needed for cold sore breakouts    Zepbound 5 mg, subcutaneous, Every 7 days

## 2024-06-04 ENCOUNTER — TELEPHONE (OUTPATIENT)
Dept: ENDOCRINOLOGY | Facility: CLINIC | Age: 64
End: 2024-06-04
Payer: MEDICARE

## 2024-06-04 NOTE — TELEPHONE ENCOUNTER
She can either have it at the appointment or prior to the appointment if she would prefer.  BMP FSH LH prolactin TSH free T4 cortisol and ACTH.

## 2024-06-05 DIAGNOSIS — D35.2 PITUITARY ADENOMA (MULTI): ICD-10-CM

## 2024-06-05 DIAGNOSIS — M81.0 OSTEOPOROSIS, UNSPECIFIED OSTEOPOROSIS TYPE, UNSPECIFIED PATHOLOGICAL FRACTURE PRESENCE: ICD-10-CM

## 2024-06-05 DIAGNOSIS — E78.5 HYPERLIPIDEMIA, UNSPECIFIED HYPERLIPIDEMIA TYPE: ICD-10-CM

## 2024-06-05 DIAGNOSIS — I10 HYPERTENSION, UNSPECIFIED TYPE: ICD-10-CM

## 2024-06-05 DIAGNOSIS — R53.83 OTHER FATIGUE: ICD-10-CM

## 2024-06-05 DIAGNOSIS — D36.9 MACROADENOMA: ICD-10-CM

## 2024-06-06 ENCOUNTER — LAB (OUTPATIENT)
Dept: LAB | Facility: LAB | Age: 64
End: 2024-06-06
Payer: MEDICARE

## 2024-06-06 DIAGNOSIS — D36.9 MACROADENOMA: ICD-10-CM

## 2024-06-06 DIAGNOSIS — I10 HYPERTENSION, UNSPECIFIED TYPE: ICD-10-CM

## 2024-06-06 DIAGNOSIS — M81.0 OSTEOPOROSIS, UNSPECIFIED OSTEOPOROSIS TYPE, UNSPECIFIED PATHOLOGICAL FRACTURE PRESENCE: ICD-10-CM

## 2024-06-06 DIAGNOSIS — E78.5 HYPERLIPIDEMIA, UNSPECIFIED HYPERLIPIDEMIA TYPE: ICD-10-CM

## 2024-06-06 DIAGNOSIS — D35.2 PITUITARY ADENOMA (MULTI): ICD-10-CM

## 2024-06-06 DIAGNOSIS — R53.83 OTHER FATIGUE: ICD-10-CM

## 2024-06-06 LAB
ANION GAP SERPL CALC-SCNC: 14 MMOL/L (ref 10–20)
BUN SERPL-MCNC: 17 MG/DL (ref 6–23)
CALCIUM SERPL-MCNC: 9.8 MG/DL (ref 8.6–10.6)
CHLORIDE SERPL-SCNC: 108 MMOL/L (ref 98–107)
CO2 SERPL-SCNC: 25 MMOL/L (ref 21–32)
CORTIS SERPL-MCNC: 22.6 UG/DL (ref 2.5–20)
CREAT SERPL-MCNC: 0.74 MG/DL (ref 0.5–1.05)
EGFRCR SERPLBLD CKD-EPI 2021: 90 ML/MIN/1.73M*2
FSH SERPL-ACNC: 43.2 IU/L
GLUCOSE SERPL-MCNC: 112 MG/DL (ref 74–99)
LH SERPL-ACNC: 17 IU/L
POTASSIUM SERPL-SCNC: 4.2 MMOL/L (ref 3.5–5.3)
SODIUM SERPL-SCNC: 143 MMOL/L (ref 136–145)
T4 FREE SERPL-MCNC: 1.16 NG/DL (ref 0.78–1.48)
TSH SERPL-ACNC: 2.51 MIU/L (ref 0.44–3.98)

## 2024-06-06 PROCEDURE — 82024 ASSAY OF ACTH: CPT

## 2024-06-06 PROCEDURE — 80048 BASIC METABOLIC PNL TOTAL CA: CPT

## 2024-06-06 PROCEDURE — 82533 TOTAL CORTISOL: CPT

## 2024-06-06 PROCEDURE — 84439 ASSAY OF FREE THYROXINE: CPT

## 2024-06-06 PROCEDURE — 83002 ASSAY OF GONADOTROPIN (LH): CPT

## 2024-06-06 PROCEDURE — 84443 ASSAY THYROID STIM HORMONE: CPT

## 2024-06-06 PROCEDURE — 83001 ASSAY OF GONADOTROPIN (FSH): CPT

## 2024-06-06 PROCEDURE — 36415 COLL VENOUS BLD VENIPUNCTURE: CPT

## 2024-06-08 LAB — ACTH PLAS-MCNC: 29.5 PG/ML (ref 7.2–63.3)

## 2024-06-11 ENCOUNTER — OFFICE VISIT (OUTPATIENT)
Dept: OTOLARYNGOLOGY | Facility: CLINIC | Age: 64
End: 2024-06-11
Payer: MEDICARE

## 2024-06-11 VITALS — BODY MASS INDEX: 26.44 KG/M2 | WEIGHT: 154.9 LBS | HEIGHT: 64 IN | TEMPERATURE: 97.3 F

## 2024-06-11 DIAGNOSIS — J32.2 CHRONIC ETHMOIDAL SINUSITIS: ICD-10-CM

## 2024-06-11 DIAGNOSIS — R43.8 DECREASED SENSE OF SMELL: Primary | ICD-10-CM

## 2024-06-11 DIAGNOSIS — J32.3 CHRONIC SPHENOIDAL SINUSITIS: ICD-10-CM

## 2024-06-11 PROCEDURE — 1157F ADVNC CARE PLAN IN RCRD: CPT | Performed by: OTOLARYNGOLOGY

## 2024-06-11 PROCEDURE — 99213 OFFICE O/P EST LOW 20 MIN: CPT | Performed by: OTOLARYNGOLOGY

## 2024-06-11 PROCEDURE — 31231 NASAL ENDOSCOPY DX: CPT | Performed by: OTOLARYNGOLOGY

## 2024-06-11 PROCEDURE — 1160F RVW MEDS BY RX/DR IN RCRD: CPT | Performed by: OTOLARYNGOLOGY

## 2024-06-11 PROCEDURE — 1159F MED LIST DOCD IN RCRD: CPT | Performed by: OTOLARYNGOLOGY

## 2024-06-11 NOTE — PROGRESS NOTES
Chief Complaint:  1. 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 Postnasal drainage  3. Decreased sense of smell   4. Headaches, migraines  5. Deviated nasal septum to left     History Of Present Illness:    Shari Guerra presents since last being seen 3/11/24.  She is not currently using any intranasal medical therapy.  Her sense of smell is decreased but she has had COVID several times and believes there is a cause-and-effect.    She has been following with endocrinology and her cortisol level was normal November 18, 2023 and December 29, 2023.  June 6, 2024 it was elevated at 22.6.  She mentioned having recent hair loss prompting concern over whether or not a hormonal issue could be contributing.    Main Symptoms:  Patient does not have anterior nasal drainage.     Patient does not have  posterior nasal drainage.    Patient does not have nasal airway obstruction.   Patient does not have  facial pain.    Patient does not have  facial pressure.    Patient has decreased sense of smell.  80% of normal (has had COVID multiple times)  Associated Symptoms:   Patient does not have nasal bleeding.     Active Problems:  Patient Active Problem List   Diagnosis    Pituitary adenoma (Multi)    Age-related osteoporosis without current pathological fracture    Elevated blood pressure reading    Fatigue    Headache    Hyperlipidemia    Hypokalemia    Migraine without aura and responsive to treatment    Nausea and vomiting    Retinal micro-aneurysm, right    Vitamin D deficiency    Decreased sense of smell    Deviated septum    Rhinorrhea    HTN (hypertension)    Macroadenoma        Past Medical History:  She has a past medical history of Fatty liver, GERD (gastroesophageal reflux disease), Hyperlipidemia, Osteoporosis, and Pituitary macroadenoma (Multi).    Surgical History:  She has a past surgical history that includes Hysterectomy.      Family History:  Family History   Problem Relation Name Age of Onset    Cirrhosis Mother      Heart attack Father      Stroke Father      Other (carotid artery stenosis) Father         Social History:  She reports that she has never smoked. She has never used smokeless tobacco. She reports current alcohol use of about 3.0 standard drinks of alcohol per week. She reports that she does not use drugs.     Allergies:  Patient has no known allergies.    Current Meds:    Current Outpatient Medications:     aspirin 81 mg EC tablet, Take 1 tablet (81 mg) by mouth 3 (three) times a week. On Monday, Wednesday, and Friday Do not start before November 24, 2023., Disp: 30 tablet, Rfl: 0    atorvastatin (Lipitor) 20 mg tablet, Take 1 tablet (20 mg) by mouth once daily., Disp: , Rfl:     cholecalciferol (Vitamin D-3) 25 MCG (1000 UT) capsule, Take 1 capsule (25 mcg) by mouth once daily., Disp: , Rfl:     citalopram (CeleXA) 20 mg tablet, Take 1 tablet (20 mg) by mouth once daily., Disp: , Rfl:     ibandronate (Boniva) 150 mg tablet, , Disp: , Rfl:     pantoprazole (ProtoNix) 40 mg EC tablet, Take 1 tablet (40 mg) by mouth once daily in the morning. Take before meals., Disp: , Rfl:     valACYclovir (Valtrex) 500 mg tablet, Take 1 tablet (500 mg) by mouth 2 times a day. As needed for cold sore breakouts, Disp: , Rfl:     Zepbound 5 mg/0.5 mL injection, Inject 5 mg under the skin every 7 days., Disp: , Rfl:     Vitals:  Visit Vitals  LMP  (LMP Unknown)   OB Status Postmenopausal   Smoking Status Never      Physical Exam:  Nose: On external exam there are neither lesions nor asymmetry of the nasal tip/dorsum. On anterior rhinoscopy, visualization posteriorly is limited on anterior examination. For this reason, to adequately evaluate posteriorly for masses, source of epistaxis, polypoid disease, debridement, and/or signs of infections, nasal endoscopy is indicated.  (Please see procedure below.)    SINONASAL ENDOSCOPY (CPT 21135): To  better evaluate the patient's symptoms, sinonasal endoscopy is indicated.  After discussion of risks and benefits, and topical decongestion and anesthesia,an endoscope was used to perform nasal endoscopy on each side.  A time out identifying the patient, the procedure, the location of the procedure and any concerns was performed prior to beginning the procedure.    Findings:  Each middle meatus and sphenoethmoid recess were normal.  Each ethmoid cavity and sphenoid are widely patent.  No crusting was present.  No evidence of purulence or polyposis.    Provider Impressions:  1. 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 Decreased sense of smell   3. Headaches, migraines  4. Deviated nasal septum to left     Discussion:  Shari Guerra has healed very well from her skull base surgery.  She has reached out to endocrinology and I feel that this is her highest yield evaluation at this juncture.  She is planning to have another evaluation with neurosurgery this summer with repeat MRI.  We reviewed her previous MRI from March 5, 2024.    Given her exam I recommended follow-up with me as needed.  She was amenable to this and all questions were answered.    Signature:  Yosvany Barnard MD

## 2024-06-21 ENCOUNTER — APPOINTMENT (OUTPATIENT)
Dept: ENDOCRINOLOGY | Facility: CLINIC | Age: 64
End: 2024-06-21
Payer: COMMERCIAL

## 2024-06-24 ENCOUNTER — APPOINTMENT (OUTPATIENT)
Dept: ENDOCRINOLOGY | Facility: CLINIC | Age: 64
End: 2024-06-24
Payer: MEDICARE

## 2024-06-24 VITALS — BODY MASS INDEX: 26.61 KG/M2 | WEIGHT: 155 LBS

## 2024-06-24 DIAGNOSIS — D35.2 PITUITARY ADENOMA (MULTI): Primary | ICD-10-CM

## 2024-06-24 DIAGNOSIS — E78.5 HYPERLIPIDEMIA, UNSPECIFIED HYPERLIPIDEMIA TYPE: ICD-10-CM

## 2024-06-24 DIAGNOSIS — I10 HYPERTENSION, UNSPECIFIED TYPE: ICD-10-CM

## 2024-06-24 PROCEDURE — 1157F ADVNC CARE PLAN IN RCRD: CPT | Performed by: INTERNAL MEDICINE

## 2024-06-24 PROCEDURE — 99214 OFFICE O/P EST MOD 30 MIN: CPT | Performed by: INTERNAL MEDICINE

## 2024-06-24 NOTE — PROGRESS NOTES
Patient ID: Shari Guerra is a 63 y.o. female who presents for Follow-up.  HPI  The patient comes in for follow up.    She had a pituitary macroadenoma post surgery November 17 pathology was consistent with a multilineage neuroendocrine tumor that was felt to be biologically nonactive.    She was assessed pre and post surgery and was eupituitary.    She has recovered with no difficulties.    She has had some nausea and fatigue.    She has had no change in her medical regimen.    She was Zepbound January through March and is now been working to maintain.    She has no symptoms of cortisol excess.    She had repeat blood work done which did have an elevated cortisol and a nonsuppressed ACTH as well as a blood sugar of 112.    She had a cortisol repeated through her primary care doctor it was 23.5 and her blood sugar fasting was 113.    Physically she has no complaints.    ROS  Comprehensive review of systems is negative.    Objective   Physical Exam  There were no vitals taken for this visit.   Vitals:    06/24/24 0958   Weight: 70.3 kg (155 lb)      Body mass index is 26.61 kg/m².      Weight 155 down 27 pounds    Eyes normal  ENT normal. No adenopathy  Thyroid palpable and normal. No nodules  Chest clear to auscultation  Heart sounds are normal  Abdomen nontender. Bowel sounds normal. No organomegaly  Feet are okay    Current Outpatient Medications   Medication Sig Dispense Refill    aspirin 81 mg EC tablet Take 1 tablet (81 mg) by mouth 3 (three) times a week. On Monday, Wednesday, and Friday Do not start before November 24, 2023. (Patient not taking: Reported on 6/11/2024) 30 tablet 0    atorvastatin (Lipitor) 20 mg tablet Take 1 tablet (20 mg) by mouth once daily.      cholecalciferol (Vitamin D-3) 25 MCG (1000 UT) capsule Take 1 capsule (25 mcg) by mouth once daily.      citalopram (CeleXA) 20 mg tablet Take 1 tablet (20 mg) by mouth once daily.      ibandronate (Boniva) 150 mg tablet       pantoprazole  (ProtoNix) 40 mg EC tablet Take 1 tablet (40 mg) by mouth once daily in the morning. Take before meals.      valACYclovir (Valtrex) 500 mg tablet Take 1 tablet (500 mg) by mouth 2 times a day. As needed for cold sore breakouts      Zepbound 5 mg/0.5 mL injection Inject 5 mg under the skin every 7 days.       No current facility-administered medications for this visit.       Assessment/Plan     1.  Pituitary macroadenoma  2.  Elevated cortisol  3.  Impaired fasting glucose  4.  Fatigue    Will check 24-hour urine for cortisol.    We discussed that with the weight loss is unlikely to be a cause especially with the preoperative suppressed dexamethasone test.    We discussed insulin resistance its pathophysiology and its impact.    Follow-up will be dependent on results.

## 2024-06-26 ENCOUNTER — LAB (OUTPATIENT)
Dept: LAB | Facility: LAB | Age: 64
End: 2024-06-26
Payer: MEDICARE

## 2024-06-26 DIAGNOSIS — D35.2 PITUITARY ADENOMA (MULTI): ICD-10-CM

## 2024-06-26 PROCEDURE — 81050 URINALYSIS VOLUME MEASURE: CPT

## 2024-06-26 PROCEDURE — 82570 ASSAY OF URINE CREATININE: CPT

## 2024-06-26 PROCEDURE — 82530 CORTISOL FREE: CPT

## 2024-06-27 LAB
COLLECT DURATION TIME SPEC: 24 HRS
CREAT 24H UR-MCNC: 93.5 MG/DL (ref 20–320)
CREAT 24H UR-MRATE: 1.4 G/24 H (ref 0.67–1.59)
SPECIMEN VOL 24H UR: 1500 ML

## 2024-06-29 LAB
ANNOTATION COMMENT IMP: ABNORMAL
COLLECT DURATION TIME SPEC: 24 HR
CORTIS F 24H UR HPLC-MCNC: 14.6 UG/L
CORTIS F 24H UR-MRATE: 21.9 UG/D
CORTIS F/CREAT 24H UR: 14.9 UG/G CRT
CREAT 24H UR-MRATE: 1470 MG/D (ref 500–1400)
CREAT UR-MCNC: 98 MG/DL
SPECIMEN VOL ?TM UR: 1500 ML

## 2024-07-01 ENCOUNTER — TELEPHONE (OUTPATIENT)
Dept: NEUROSURGERY | Facility: HOSPITAL | Age: 64
End: 2024-07-01
Payer: MEDICARE

## 2024-07-09 DIAGNOSIS — D35.2 PITUITARY ADENOMA (MULTI): Primary | ICD-10-CM

## 2024-08-26 ENCOUNTER — HOSPITAL ENCOUNTER (OUTPATIENT)
Dept: RADIOLOGY | Facility: CLINIC | Age: 64
Discharge: HOME | End: 2024-08-26
Payer: MEDICARE

## 2024-08-26 DIAGNOSIS — D35.2 PITUITARY ADENOMA (MULTI): ICD-10-CM

## 2024-08-26 PROCEDURE — 70553 MRI BRAIN STEM W/O & W/DYE: CPT | Performed by: RADIOLOGY

## 2024-08-26 PROCEDURE — 70553 MRI BRAIN STEM W/O & W/DYE: CPT

## 2024-08-26 PROCEDURE — 2550000001 HC RX 255 CONTRASTS: Performed by: NURSE PRACTITIONER

## 2024-08-26 PROCEDURE — A9575 INJ GADOTERATE MEGLUMI 0.1ML: HCPCS | Performed by: NURSE PRACTITIONER

## 2024-08-26 RX ORDER — GADOTERATE MEGLUMINE 376.9 MG/ML
15 INJECTION INTRAVENOUS
Status: COMPLETED | OUTPATIENT
Start: 2024-08-26 | End: 2024-08-26

## 2024-08-28 DIAGNOSIS — D35.2 PITUITARY ADENOMA (MULTI): Primary | ICD-10-CM

## 2024-08-28 NOTE — RESULT ENCOUNTER NOTE
MR Sella continues to show stable postoperative changes without recurrence or residual tumor. Will continue with imaging surveillance with repeat MRI sella w/wo in 1 year.

## 2024-11-05 ENCOUNTER — HOSPITAL ENCOUNTER (OUTPATIENT)
Dept: RADIOLOGY | Facility: CLINIC | Age: 64
Discharge: HOME | End: 2024-11-05
Payer: MEDICARE

## 2024-11-05 DIAGNOSIS — R51.9 HEADACHE, UNSPECIFIED: ICD-10-CM

## 2024-11-05 PROCEDURE — 70544 MR ANGIOGRAPHY HEAD W/O DYE: CPT

## 2024-11-05 PROCEDURE — A9575 INJ GADOTERATE MEGLUMI 0.1ML: HCPCS

## 2024-11-05 PROCEDURE — 70553 MRI BRAIN STEM W/O & W/DYE: CPT | Performed by: RADIOLOGY

## 2024-11-05 PROCEDURE — 2550000001 HC RX 255 CONTRASTS

## 2024-11-05 PROCEDURE — 70553 MRI BRAIN STEM W/O & W/DYE: CPT

## 2024-11-05 RX ORDER — GADOTERATE MEGLUMINE 376.9 MG/ML
14 INJECTION INTRAVENOUS
Status: COMPLETED | OUTPATIENT
Start: 2024-11-05 | End: 2024-11-05

## 2025-06-23 ENCOUNTER — APPOINTMENT (OUTPATIENT)
Dept: ENDOCRINOLOGY | Facility: CLINIC | Age: 65
End: 2025-06-23
Payer: MEDICARE

## 2025-06-23 ENCOUNTER — HOSPITAL ENCOUNTER (OUTPATIENT)
Dept: RADIOLOGY | Facility: CLINIC | Age: 65
Discharge: HOME | End: 2025-06-23
Payer: MEDICARE

## 2025-06-23 VITALS — DIASTOLIC BLOOD PRESSURE: 76 MMHG | WEIGHT: 159 LBS | BODY MASS INDEX: 27.48 KG/M2 | SYSTOLIC BLOOD PRESSURE: 114 MMHG

## 2025-06-23 DIAGNOSIS — D35.2 PITUITARY ADENOMA (MULTI): Primary | ICD-10-CM

## 2025-06-23 DIAGNOSIS — R29.898 OTHER SYMPTOMS AND SIGNS INVOLVING THE MUSCULOSKELETAL SYSTEM: ICD-10-CM

## 2025-06-23 DIAGNOSIS — R73.9 HYPERGLYCEMIA: ICD-10-CM

## 2025-06-23 DIAGNOSIS — I10 HYPERTENSION, UNSPECIFIED TYPE: ICD-10-CM

## 2025-06-23 DIAGNOSIS — E78.5 HYPERLIPIDEMIA, UNSPECIFIED HYPERLIPIDEMIA TYPE: ICD-10-CM

## 2025-06-23 PROCEDURE — 99214 OFFICE O/P EST MOD 30 MIN: CPT | Performed by: INTERNAL MEDICINE

## 2025-06-23 PROCEDURE — 72141 MRI NECK SPINE W/O DYE: CPT | Performed by: RADIOLOGY

## 2025-06-23 PROCEDURE — 3078F DIAST BP <80 MM HG: CPT | Performed by: INTERNAL MEDICINE

## 2025-06-23 PROCEDURE — 3074F SYST BP LT 130 MM HG: CPT | Performed by: INTERNAL MEDICINE

## 2025-06-23 PROCEDURE — 72141 MRI NECK SPINE W/O DYE: CPT

## 2025-06-23 NOTE — PROGRESS NOTES
Patient ID: Shari Guerra is a 64 y.o. female who presents for Follow-up (Pituitary adenoma follow up).  HPI  The patient comes in for follow up.    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  She also has hypertension hyperlipidemia impaired fasting glucose.    She was assessed pre and post surgery and was eupituitary.    She has recovered with no difficulties.    She will be having a repeat MRI in August.    She is currently being evaluated for fibromyalgia by neurology and is having an MRI of her spine later today.    She has been on Zepbound 7.5 mg.    Physically she has no complaints.      ROS  Comprehensive review of systems is negative.    Objective   Physical Exam  Visit Vitals  /76      Vitals:    06/23/25 0836   Weight: 72.1 kg (159 lb)      Body mass index is 27.48 kg/m².      Weight 159 up 4 pounds    Eyes normal  ENT normal. No adenopathy  Thyroid palpable and normal. No nodules  Chest clear to auscultation  Heart sounds are normal  Abdomen nontender. Bowel sounds normal. No organomegaly  Feet are okay    Current Medications[1]    Assessment/Plan     1.  Pituitary macroadenoma postsurgery  2.  Hypertension  3.  Hyperlipidemia  4.  Impaired fasting glucose    Will check hemoglobin A1c BMP TSH free T4 free T3 FSH LH prolactin IGF-I ACTH and cortisol.    We discussed her fasting blood sugar from last year.    If her blood sugar is above 100 at this point on the Zepbound we discussed that she likely has diabetes and could try to get Zepbound switch to Mounjaro.    She will follow-up with neurosurgery.    She will follow-up with me in 1 year or sooner as needed.           [1]   Current Outpatient Medications   Medication Sig Dispense Refill    aspirin 81 mg EC tablet Take 1 tablet (81 mg) by mouth 3 (three) times a week. On Monday, Wednesday, and Friday Do not start  before November 24, 2023. (Patient not taking: Reported on 6/11/2024) 30 tablet 0    atorvastatin (Lipitor) 20 mg tablet Take 1 tablet (20 mg) by mouth once daily.      cholecalciferol (Vitamin D-3) 25 MCG (1000 UT) capsule Take 1 capsule (25 mcg) by mouth once daily.      citalopram (CeleXA) 20 mg tablet Take 1 tablet (20 mg) by mouth once daily.      ibandronate (Boniva) 150 mg tablet       pantoprazole (ProtoNix) 40 mg EC tablet Take 1 tablet (40 mg) by mouth once daily in the morning. Take before meals.      valACYclovir (Valtrex) 500 mg tablet Take 1 tablet (500 mg) by mouth 2 times a day. As needed for cold sore breakouts      Zepbound 5 mg/0.5 mL injection Inject 5 mg under the skin every 7 days.       No current facility-administered medications for this visit.

## 2025-06-26 LAB
ACTH PLAS-MCNC: 11 PG/ML (ref 6–50)
ANION GAP SERPL CALCULATED.4IONS-SCNC: 9 MMOL/L (CALC) (ref 7–17)
BUN SERPL-MCNC: 14 MG/DL (ref 7–25)
BUN/CREAT SERPL: NORMAL (CALC) (ref 6–22)
CALCIUM SERPL-MCNC: 9.8 MG/DL (ref 8.6–10.4)
CHLORIDE SERPL-SCNC: 108 MMOL/L (ref 98–110)
CO2 SERPL-SCNC: 24 MMOL/L (ref 20–32)
CORTIS SERPL-MCNC: 16.1 MCG/DL
CREAT SERPL-MCNC: 0.7 MG/DL (ref 0.5–1.05)
EGFRCR SERPLBLD CKD-EPI 2021: 95 ML/MIN/1.73M2
EST. AVERAGE GLUCOSE BLD GHB EST-MCNC: 97 MG/DL
EST. AVERAGE GLUCOSE BLD GHB EST-SCNC: 5.4 MMOL/L
FSH SERPL-ACNC: 50.2 MIU/ML
GLUCOSE SERPL-MCNC: 91 MG/DL (ref 65–99)
HBA1C MFR BLD: 5 %
IGF-I SERPL-MCNC: 153 NG/ML (ref 41–279)
IGF-I Z-SCORE SERPL: 0.6 SD
LH SERPL-ACNC: 19.1 MIU/ML
POTASSIUM SERPL-SCNC: 4.1 MMOL/L (ref 3.5–5.3)
PROLACTIN SERPL-MCNC: 4.3 NG/ML
SODIUM SERPL-SCNC: 141 MMOL/L (ref 135–146)
T3FREE SERPL-MCNC: 3 PG/ML (ref 2.3–4.2)
T4 FREE SERPL-MCNC: 1.3 NG/DL (ref 0.8–1.8)
TSH SERPL-ACNC: 2.34 MIU/L (ref 0.4–4.5)

## 2025-08-01 ENCOUNTER — HOSPITAL ENCOUNTER (OUTPATIENT)
Dept: RADIOLOGY | Facility: CLINIC | Age: 65
Discharge: HOME | End: 2025-08-01
Payer: MEDICARE

## 2025-08-01 DIAGNOSIS — D35.2 PITUITARY ADENOMA (MULTI): ICD-10-CM

## 2025-08-01 PROCEDURE — 2550000001 HC RX 255 CONTRASTS: Performed by: NURSE PRACTITIONER

## 2025-08-01 PROCEDURE — A9575 INJ GADOTERATE MEGLUMI 0.1ML: HCPCS | Performed by: NURSE PRACTITIONER

## 2025-08-01 PROCEDURE — 70553 MRI BRAIN STEM W/O & W/DYE: CPT

## 2025-08-01 RX ORDER — GADOTERATE MEGLUMINE 376.9 MG/ML
14 INJECTION INTRAVENOUS
Status: COMPLETED | OUTPATIENT
Start: 2025-08-01 | End: 2025-08-01

## 2025-08-01 RX ADMIN — GADOTERATE MEGLUMINE 14 ML: 376.9 INJECTION INTRAVENOUS at 08:56

## 2025-08-22 ENCOUNTER — RESULTS FOLLOW-UP (OUTPATIENT)
Dept: NEUROSURGERY | Facility: CLINIC | Age: 65
End: 2025-08-22
Payer: MEDICARE

## 2026-06-24 ENCOUNTER — APPOINTMENT (OUTPATIENT)
Dept: ENDOCRINOLOGY | Facility: CLINIC | Age: 66
End: 2026-06-24
Payer: MEDICARE

## (undated) DEVICE — REST, HEAD, BAGEL, 9 IN

## (undated) DEVICE — BOWL, BASIN, 32 OZ, STERILE

## (undated) DEVICE — TUBE, SALEM SUMP, 16 FR X 48IN, ENFIT

## (undated) DEVICE — Device

## (undated) DEVICE — DRAPE, FLUID WARMER

## (undated) DEVICE — TOWEL, SURGICAL, NEURO, O/R, 16 X 26, BLUE, STERILE

## (undated) DEVICE — DRAPE, INSTRUMENT, W/POUCH, STERI DRAPE, 7 X 11 IN, DISPOSABLE, STERILE

## (undated) DEVICE — CATHETER TRAY, SURESTEP, 16FR, URINE METER W/STATLOCK

## (undated) DEVICE — SEALANT, HEMOSTATIC, FLOSEAL, 10 ML

## (undated) DEVICE — COVER, CART, 45 X 27 X 48 IN, CLEAR

## (undated) DEVICE — TRAP, SPECIMEN, NEPTUNE QUICK COLLECTOR

## (undated) DEVICE — DRESSING, NASOPORE, 8CM FIRM